# Patient Record
Sex: FEMALE | Race: WHITE | HISPANIC OR LATINO | Employment: OTHER | URBAN - METROPOLITAN AREA
[De-identification: names, ages, dates, MRNs, and addresses within clinical notes are randomized per-mention and may not be internally consistent; named-entity substitution may affect disease eponyms.]

---

## 2017-08-07 ENCOUNTER — HOSPITAL ENCOUNTER (OUTPATIENT)
Dept: MAMMOGRAPHY | Facility: CLINIC | Age: 62
Discharge: HOME | End: 2017-08-07
Attending: FAMILY MEDICINE | Admitting: FAMILY MEDICINE
Payer: COMMERCIAL

## 2017-08-07 DIAGNOSIS — Z12.31 VISIT FOR SCREENING MAMMOGRAM: ICD-10-CM

## 2017-08-07 PROCEDURE — 77063 BREAST TOMOSYNTHESIS BI: CPT

## 2017-08-07 PROCEDURE — G0202 SCR MAMMO BI INCL CAD: HCPCS

## 2017-09-03 ENCOUNTER — HOSPITAL ENCOUNTER (EMERGENCY)
Facility: CLINIC | Age: 62
Discharge: HOME | End: 2017-09-03
Attending: EMERGENCY MEDICINE | Admitting: EMERGENCY MEDICINE
Payer: COMMERCIAL

## 2017-09-03 ENCOUNTER — APPOINTMENT (OUTPATIENT)
Dept: CT IMAGING | Facility: CLINIC | Age: 62
End: 2017-09-03
Attending: EMERGENCY MEDICINE
Payer: COMMERCIAL

## 2017-09-03 VITALS
HEART RATE: 111 BPM | WEIGHT: 149 LBS | TEMPERATURE: 98.2 F | HEIGHT: 60 IN | RESPIRATION RATE: 18 BRPM | OXYGEN SATURATION: 96 % | SYSTOLIC BLOOD PRESSURE: 115 MMHG | DIASTOLIC BLOOD PRESSURE: 78 MMHG | BODY MASS INDEX: 29.25 KG/M2

## 2017-09-03 DIAGNOSIS — R31.9 URINARY TRACT INFECTION WITH HEMATURIA, SITE UNSPECIFIED: ICD-10-CM

## 2017-09-03 DIAGNOSIS — N39.0 URINARY TRACT INFECTION WITH HEMATURIA, SITE UNSPECIFIED: ICD-10-CM

## 2017-09-03 LAB
ALBUMIN UR-MCNC: 10 MG/DL
ANION GAP SERPL CALCULATED.3IONS-SCNC: 7 MMOL/L (ref 3–14)
APPEARANCE UR: ABNORMAL
BILIRUB UR QL STRIP: NEGATIVE
BUN SERPL-MCNC: 13 MG/DL (ref 7–30)
CALCIUM SERPL-MCNC: 9.1 MG/DL (ref 8.5–10.1)
CHLORIDE SERPL-SCNC: 107 MMOL/L (ref 94–109)
CO2 SERPL-SCNC: 28 MMOL/L (ref 20–32)
COLOR UR AUTO: YELLOW
CREAT SERPL-MCNC: 0.75 MG/DL (ref 0.52–1.04)
ERYTHROCYTE [DISTWIDTH] IN BLOOD BY AUTOMATED COUNT: 12.7 % (ref 10–15)
GFR SERPL CREATININE-BSD FRML MDRD: 78 ML/MIN/1.7M2
GLUCOSE SERPL-MCNC: 114 MG/DL (ref 70–99)
GLUCOSE UR STRIP-MCNC: NEGATIVE MG/DL
HCT VFR BLD AUTO: 37.7 % (ref 35–47)
HGB BLD-MCNC: 12.8 G/DL (ref 11.7–15.7)
HGB UR QL STRIP: ABNORMAL
KETONES UR STRIP-MCNC: NEGATIVE MG/DL
LEUKOCYTE ESTERASE UR QL STRIP: ABNORMAL
MCH RBC QN AUTO: 32.6 PG (ref 26.5–33)
MCHC RBC AUTO-ENTMCNC: 34 G/DL (ref 31.5–36.5)
MCV RBC AUTO: 96 FL (ref 78–100)
MUCOUS THREADS #/AREA URNS LPF: PRESENT /LPF
NITRATE UR QL: NEGATIVE
PH UR STRIP: 6.5 PH (ref 5–7)
PLATELET # BLD AUTO: 289 10E9/L (ref 150–450)
POTASSIUM SERPL-SCNC: 3.6 MMOL/L (ref 3.4–5.3)
RBC # BLD AUTO: 3.93 10E12/L (ref 3.8–5.2)
RBC #/AREA URNS AUTO: >182 /HPF (ref 0–2)
SODIUM SERPL-SCNC: 142 MMOL/L (ref 133–144)
SOURCE: ABNORMAL
SP GR UR STRIP: 1.01 (ref 1–1.03)
UROBILINOGEN UR STRIP-MCNC: NORMAL MG/DL (ref 0–2)
WBC # BLD AUTO: 7.2 10E9/L (ref 4–11)
WBC #/AREA URNS AUTO: 5 /HPF (ref 0–2)

## 2017-09-03 PROCEDURE — 25000128 H RX IP 250 OP 636: Performed by: EMERGENCY MEDICINE

## 2017-09-03 PROCEDURE — 87088 URINE BACTERIA CULTURE: CPT | Performed by: EMERGENCY MEDICINE

## 2017-09-03 PROCEDURE — 81001 URINALYSIS AUTO W/SCOPE: CPT | Performed by: EMERGENCY MEDICINE

## 2017-09-03 PROCEDURE — 74176 CT ABD & PELVIS W/O CONTRAST: CPT

## 2017-09-03 PROCEDURE — 96374 THER/PROPH/DIAG INJ IV PUSH: CPT

## 2017-09-03 PROCEDURE — 99285 EMERGENCY DEPT VISIT HI MDM: CPT | Mod: 25

## 2017-09-03 PROCEDURE — 96375 TX/PRO/DX INJ NEW DRUG ADDON: CPT

## 2017-09-03 PROCEDURE — 85027 COMPLETE CBC AUTOMATED: CPT | Performed by: EMERGENCY MEDICINE

## 2017-09-03 PROCEDURE — 87086 URINE CULTURE/COLONY COUNT: CPT | Performed by: EMERGENCY MEDICINE

## 2017-09-03 PROCEDURE — 80048 BASIC METABOLIC PNL TOTAL CA: CPT | Performed by: EMERGENCY MEDICINE

## 2017-09-03 PROCEDURE — 87186 SC STD MICRODIL/AGAR DIL: CPT | Performed by: EMERGENCY MEDICINE

## 2017-09-03 RX ORDER — MORPHINE SULFATE 4 MG/ML
4 INJECTION, SOLUTION INTRAMUSCULAR; INTRAVENOUS ONCE
Status: COMPLETED | OUTPATIENT
Start: 2017-09-03 | End: 2017-09-03

## 2017-09-03 RX ORDER — CEPHALEXIN 500 MG/1
500 CAPSULE ORAL 2 TIMES DAILY
Qty: 14 CAPSULE | Refills: 0 | Status: SHIPPED | OUTPATIENT
Start: 2017-09-03 | End: 2017-09-10

## 2017-09-03 RX ORDER — ONDANSETRON 2 MG/ML
4 INJECTION INTRAMUSCULAR; INTRAVENOUS
Status: COMPLETED | OUTPATIENT
Start: 2017-09-03 | End: 2017-09-03

## 2017-09-03 RX ORDER — HYDROCODONE BITARTRATE AND ACETAMINOPHEN 5; 325 MG/1; MG/1
1 TABLET ORAL EVERY 4 HOURS PRN
Qty: 10 TABLET | Refills: 0 | Status: ON HOLD | OUTPATIENT
Start: 2017-09-03 | End: 2017-11-03

## 2017-09-03 RX ORDER — PHENAZOPYRIDINE HYDROCHLORIDE 200 MG/1
200 TABLET, FILM COATED ORAL 3 TIMES DAILY PRN
Qty: 6 TABLET | Refills: 0 | Status: SHIPPED | OUTPATIENT
Start: 2017-09-03 | End: 2017-09-05

## 2017-09-03 RX ADMIN — ONDANSETRON 4 MG: 2 SOLUTION INTRAMUSCULAR; INTRAVENOUS at 21:35

## 2017-09-03 RX ADMIN — MORPHINE SULFATE 4 MG: 4 INJECTION, SOLUTION INTRAMUSCULAR; INTRAVENOUS at 21:35

## 2017-09-03 ASSESSMENT — ENCOUNTER SYMPTOMS
DYSURIA: 1
FLANK PAIN: 1
HEMATURIA: 1
FEVER: 0

## 2017-09-03 NOTE — ED AVS SNAPSHOT
Emergency Department    [address] [facility] [address]    Phone:  [phone]    Fax:  [phone]                                       Kelsie Liang   MRN: [id number]    Department:   Emergency Department   Date of Visit:  9/3/2017           After Visit Summary Signature Page     I have received my discharge instructions, and my questions have been answered. I have discussed any challenges I see with this plan with the nurse or doctor.    ..........................................................................................................................................  Patient/Patient Representative Signature      ..........................................................................................................................................  Patient Representative Print Name and Relationship to Patient    ..................................................               ................................................  Date                                            Time    ..........................................................................................................................................  Reviewed by Signature/Title    ...................................................              ..............................................  Date                                                            Time

## 2017-09-03 NOTE — ED AVS SNAPSHOT
Emergency Department    [address] [facility] [address]    Phone:  [phone]    Fax:  [phone]                                       Kelsie Liang   MRN: [id number]    Department:   Emergency Department   Date of Visit:  9/3/2017           Patient Information     Date Of Birth          1955        Your diagnoses for this visit were:     Urinary tract infection with hematuria, site unspecified        You were seen by Iris Mars MD.      Follow-up Information     Schedule an appointment as soon as possible for a visit with Iona Gabriel MD.    Specialty:  Family Practice    Contact information:    [facility]  [address] [facility] [address]  [facility] [id number] [phone]          Follow up with  Emergency Department.    Specialty:  EMERGENCY MEDICINE    Why:  If symptoms worsen    Contact information:    [address] [facility] [id number] [phone]        Discharge Instructions       Start the antibiotics   Take the norco for pain as needed  If the blood does not resolve with treatment, then would recommend following up with urology or PCP    Discharge Instructions  Urinary Tract Infection  You or your child have been diagnosed with a urinary tract infection, or UTI. The urinary tract includes the kidneys (which make urine/pee), ureters (the tubes that carry urine/pee from the kidneys to the bladder), the bladder (which stores urine/pee), and urethra (the tube that carries urine/pee out of the bladder). Urinary tract infections occur when bacteria travel up the urethra into the bladder (bladder infection) and, in some cases, from there into the kidneys (kidney infection).  Generally, every Emergency Department visit should have a follow-up clinic visit with either a primary or a specialty clinic/provider. Please follow-up as instructed by your emergency provider today.  Return to the Emergency Department if:    You or your  child have severe back pain.    You or your child are vomiting (throwing up) so that you cannot take your medicine.    You or your child have a new fever (had not previously had a fever) over 101 F.    You or your child have confusion or are very weak, or feel very ill.    Your child seems much more ill, will not wake up, will not respond right, or is crying for a long time and will not calm down.    You or your child are showing signs of dehydration. These signs may include decreased urination (pee), dry mouth/gums/tongue, or decreased activity.    Follow-up with your provider:     Children under 24 months need to be seen by their regular provider within one week after a diagnosis of a UTI. It may be necessary to do some more tests to look at the child s kidney or bladder.    You should begin to feel better within 24 - 48 hours of starting your antibiotic; follow-up with your regular clinic/doctor/provider if this is not the case.    Treatment:     You will be treated with an antibiotic to kill the bacteria. We have to make an educated guess, based on what we know about common bacteria and antibiotics, as to which antibiotic will work for your infection. We will be correct most times but there will be some cases where the antibiotic chosen is not correct (see urine cultures below).    Take a pain medication such as acetaminophen (Tylenol ) or ibuprofen (Advil , Motrin , Nuprin ).    Phenazopyridine (Pyridium , Uristat ) is a prescription medication that numbs the bladder to reduce the burning pain of some UTIs.  The same medication is available in a non-prescription version (Azo-Standard , Urodol ). This medication will change the color of the urine and tears (usually blue or orange). If you wear contacts, do not wear them while taking this medication as they may be stained by the medication.    Urine Cultures:    If indicated, a urine culture may have been performed today. This test generally takes 24-48 hours to  "complete so the results are not known at this time. The results can confirm that an infection is present but also determine which antibiotic is effective for the specific bacteria that is causing the infection. If your urine culture shows that the antibiotic you were given today will not work to treat your infection, we will attempt to contact you to make arrangements to change the antibiotic. If the culture confirms that the antibiotic is effective for your infection, you will not be contacted. We often recommend follow-up with your regular physician/provider on the culture results regardless of this process.    Antibiotic Warning:     If you have been placed on antibiotics - watch for signs of allergic reaction.  These include rash, lip swelling, difficulty breathing, wheezing, and dizziness.  If you develop any of these symptoms, stop the antibiotic immediately and go to an emergency room or urgent care for evaluation.    Probiotics: If you have been given an antibiotic, you may want to also take a probiotic pill or eat yogurt with live cultures. Probiotics have \"good bacteria\" to help your intestines stay healthy. Studies have shown that probiotics help prevent diarrhea and other intestine problems (including C. diff infection) when you take antibiotics. You can buy these without a prescription in the pharmacy section of the store.   If you were given a prescription for medicine here today, be sure to read all of the information (including the package insert) that comes with your prescription.  This will include important information about the medicine, its side effects, and any warnings that you need to know about.  The pharmacist who fills the prescription can provide more information and answer questions you may have about the medicine.  If you have questions or concerns that the pharmacist cannot address, please call or return to the Emergency Department.   Remember that you can always come back to the " Emergency Department if you are not able to see your regular provider in the amount of time listed above, if you get any new symptoms, or if there is anything that worries you.    Opioid Medication Information    You have been given a prescription for an opioid (narcotic) pain medicine and/or have received a pain medicine while here in the Emergency Department. These medicines can make you drowsy or impaired. You must not drive, operate dangerous equipment, or engage in any other dangerous activities while taking these medications. If you drive while taking these medications, you could be arrested for driving under the influence (DUI). Do not drink any alcohol while you are taking these medications.     Opioid pain medications can cause addiction. If you have a history of chemical dependency of any type, you are at a higher risk of becoming addicted to pain medications.  Only take these prescribed medications to treat your pain when all other options have been tried. Take it for as short a time and as few doses as possible. Store your pain pills in a secure place, as they are frequently stolen and provide a dangerous opportunity for children or visitors in your house to start abusing these powerful medications. We will not replace any lost or stolen medicine. If you do not finish your medication, please dispose of the remaining medication as recommended by your pharmacist.     The [place] [employer] has additional information on medication disposal: https://www.pca.[facility].[place].us/living-green/managing-unwanted-medications.      Many prescription pain medications contain Tylenol  (acetaminophen), including Vicodin , Tylenol #3 , Norco , Lortab , and Percocet .  You should not take any extra pills of Tylenol  if you are using these prescription medications or you can get very sick.  Do not ever take more than 3000 mg of acetaminophen in any 24 hour period.    All opioids tend to cause constipation. Drink  plenty of water and eat foods that have a lot of fiber, such as fruits, vegetables, prune juice, apple juice and high fiber cereal.  Take a laxative if you don t move your bowels at least every other day. Miralax , Milk of Magnesia, Colace , or Senna  can be used to keep you regular.          24 Hour Appointment Hotline       To make an appointment at any [facility], call [phone] ([phone]). If you don't have a family doctor or clinic, we will help you find one. [facility] clinics are conveniently located to serve the needs of you and your family.             Review of your medicines      START taking        Dose / Directions Last dose taken    cephALEXin 500 MG capsule   Commonly known as:  KEFLEX   Dose:  500 mg   Quantity:  14 capsule        Take 1 capsule (500 mg) by mouth 2 times daily for 7 days   Refills:  0        HYDROcodone-acetaminophen 5-325 MG per tablet   Commonly known as:  NORCO   Dose:  1 tablet   Quantity:  10 tablet        Take 1 tablet by mouth every 4 hours as needed for moderate to severe pain   Refills:  0        phenazopyridine 200 MG tablet   Commonly known as:  PYRIDIUM   Dose:  200 mg   Quantity:  6 tablet        Take 1 tablet (200 mg) by mouth 3 times daily as needed for irritation   Refills:  0          Our records show that you are taking the medicines listed below. If these are incorrect, please call your family doctor or clinic.        Dose / Directions Last dose taken    acetaminophen 325 MG tablet   Commonly known as:  TYLENOL   Dose:  650 mg   Quantity:  100 tablet        Take 2 tablets (650 mg) by mouth every 4 hours as needed for mild pain or fever   Refills:  0        AMBIEN PO   Dose:  5 mg        Take 5 mg by mouth At Bedtime 2 tabs   Refills:  0        CYMBALTA PO   Dose:  120 mg   Indication:  Major Depressive Disorder, Musculoskeletal Pain        Take 120 mg by mouth daily   Refills:  0        hydrOXYzine 25 MG tablet   Commonly known as:  ATARAX   Dose:  25  mg   Quantity:  40 tablet        Take 1 tablet (25 mg) by mouth every 6 hours as needed for itching (and nausea, spasms, adjuvant pain)   Refills:  0        ondansetron 4 MG ODT tab   Commonly known as:  ZOFRAN-ODT   Dose:  4 mg   Quantity:  15 tablet        Take 1 tablet (4 mg) by mouth every 6 hours as needed for nausea   Refills:  0        oxyCODONE 5 MG IR tablet   Commonly known as:  ROXICODONE   Dose:  5-15 mg   Quantity:  75 tablet        Take 1-3 tablets (5-15 mg) by mouth every 3 hours as needed for pain or other (Moderate to Severe)   Refills:  0        PROVIGIL PO   Dose:  200 mg        Take 200 mg by mouth daily Pt taking 2 tabs daily, unsure of dose   Refills:  0        senna-docusate 8.6-50 MG per tablet   Commonly known as:  SENOKOT-S;PERICOLACE   Dose:  2 tablet   Quantity:  30 tablet        Take 2 tablets by mouth daily as needed for constipation Take while on oral narcotics to prevent or treat constipation.   Refills:  0        XANAX PO   Dose:  0.25 mg        Take 0.25 mg by mouth 4 times daily as needed for anxiety   Refills:  0                Prescriptions were sent or printed at these locations (3 Prescriptions)                   Other Prescriptions                Printed at Department/Unit printer (3 of 3)         cephALEXin (KEFLEX) 500 MG capsule               phenazopyridine (PYRIDIUM) 200 MG tablet               HYDROcodone-acetaminophen (NORCO) 5-325 MG per tablet                Procedures and tests performed during your visit     Abd/pelvis CT no contrast - Stone Protocol    Basic metabolic panel    CBC (+ platelets, no diff)    UA with Microscopic reflex to Culture    Urine Culture Aerobic Bacterial      Orders Needing Specimen Collection     None      Pending Results     Date and Time Order Name Status Description    9/3/2017 2028 Urine Culture Aerobic Bacterial Preliminary             Pending Culture Results     Date and Time Order Name Status Description    9/3/2017 2028 Urine  Culture Aerobic Bacterial Preliminary             Pending Results Instructions     If you had any lab results that were not finalized at the time of your Discharge, you can call the ED Lab Result RN at [phone]. You will be contacted by this team for any positive Lab results or changes in treatment. The nurses are available 7 days a week from 10A to 6:30P.  You can leave a message 24 hours per day and they will return your call.        Test Results From Your Hospital Stay        9/3/2017  8:54 PM      Component Results     Component Value Ref Range & Units Status    Color Urine Yellow  Final    Appearance Urine Slightly Cloudy  Final    Glucose Urine Negative NEG^Negative mg/dL Final    Bilirubin Urine Negative NEG^Negative Final    Ketones Urine Negative NEG^Negative mg/dL Final    Specific Gravity Urine 1.014 1.003 - 1.035 Final    Blood Urine Large (A) NEG^Negative Final    pH Urine 6.5 5.0 - 7.0 pH Final    Protein Albumin Urine 10 (A) NEG^Negative mg/dL Final    Urobilinogen mg/dL Normal 0.0 - 2.0 mg/dL Final    Nitrite Urine Negative NEG^Negative Final    Leukocyte Esterase Urine Large (A) NEG^Negative Final    Source Midstream Urine  Final    WBC Urine 5 (H) 0 - 2 /HPF Final    RBC Urine >182 (H) 0 - 2 /HPF Final    Mucous Urine Present (A) NEG^Negative /LPF Final         9/3/2017 10:51 PM      Component Results     Component    Specimen Description    Midstream Urine    Special Requests    Specimen received in preservative    Culture Micro    PENDING         9/3/2017  9:53 PM      Component Results     Component Value Ref Range & Units Status    Sodium 142 133 - 144 mmol/L Final    Potassium 3.6 3.4 - 5.3 mmol/L Final    Chloride 107 94 - 109 mmol/L Final    Carbon Dioxide 28 20 - 32 mmol/L Final    Anion Gap 7 3 - 14 mmol/L Final    Glucose 114 (H) 70 - 99 mg/dL Final    Urea Nitrogen 13 7 - 30 mg/dL Final    Creatinine 0.75 0.52 - 1.04 mg/dL Final    GFR Estimate 78 >60 mL/min/1.7m2 Final    Non   GFR Calc    GFR Estimate If Black >90 >60 mL/min/1.7m2 Final    African American GFR Calc    Calcium 9.1 8.5 - 10.1 mg/dL Final         9/3/2017  9:38 PM      Component Results     Component Value Ref Range & Units Status    WBC 7.2 4.0 - 11.0 10e9/L Final    RBC Count 3.93 3.8 - 5.2 10e12/L Final    Hemoglobin 12.8 11.7 - 15.7 g/dL Final    Hematocrit 37.7 35.0 - 47.0 % Final    MCV 96 78 - 100 fl Final    MCH 32.6 26.5 - 33.0 pg Final    MCHC 34.0 31.5 - 36.5 g/dL Final    RDW 12.7 10.0 - 15.0 % Final    Platelet Count 289 150 - 450 10e9/L Final         9/3/2017 10:51 PM      Narrative     CT ABDOMEN AND PELVIS WITHOUT CONTRAST 9/3/2017 10:24 PM     HISTORY: Flank pain, hematuria. Rule out stone.    COMPARISON: 12/7/2015.    TECHNIQUE: Axial CT images of the abdomen and pelvis from the  diaphragm to the symphysis pubis were acquired without IV contrast.  Radiation dose for this scan was reduced using automated exposure  control, adjustment of the mA and/or kV according to patient size, or  iterative reconstruction technique.    FINDINGS: There are no stones seen within either kidney, either  ureter, or the bladder. There is no hydroureter or hydronephrosis.  There is no perinephric fat stranding. Kidneys are normal in size and  configuration.  Moderate stool. There are no dilated loops of small  intestine or large bowel to suggest ileus or obstruction. No  diverticulitis. Appendix not confidently identified. Cholecystectomy  changes. Liver unremarkable. No splenomegaly.  No definite adrenal  nodules.  Pancreas grossly unremarkable. The remainder of the  visualized abdomen is unremarkable on this noncontrast scan. Survey of  the visualized bony structures demonstrates no destructive bony  lesions.   The visualized lung bases are unremarkable.         Impression     IMPRESSION: No acute process demonstrated in the abdomen and pelvis.     MANDO CALLAWAY MD                Clinical Quality Measure:  Blood Pressure Screening     Your blood pressure was checked while you were in the emergency department today. The last reading we obtained was  BP: 130/84 . Please read the guidelines below about what these numbers mean and what you should do about them.  If your systolic blood pressure (the top number) is less than 120 and your diastolic blood pressure (the bottom number) is less than 80, then your blood pressure is normal. There is nothing more that you need to do about it.  If your systolic blood pressure (the top number) is 120-139 or your diastolic blood pressure (the bottom number) is 80-89, your blood pressure may be higher than it should be. You should have your blood pressure rechecked within a year by a primary care provider.  If your systolic blood pressure (the top number) is 140 or greater or your diastolic blood pressure (the bottom number) is 90 or greater, you may have high blood pressure. High blood pressure is treatable, but if left untreated over time it can put you at risk for heart attack, stroke, or kidney failure. You should have your blood pressure rechecked by a primary care provider within the next 4 weeks.  If your provider in the emergency department today gave you specific instructions to follow-up with your doctor or provider even sooner than that, you should follow that instruction and not wait for up to 4 weeks for your follow-up visit.        Thank you for choosing [facility]       Thank you for choosing [facility] for your care. Our goal is always to provide you with excellent care. Hearing back from our patients is one way we can continue to improve our services. Please take a few minutes to complete the written survey that you may receive in the mail after you visit with us. Thank you!        [employer] Information     [employer] lets you send messages to your doctor, view your test results, renew your prescriptions, schedule appointments and more. To sign up, go to  [email or web address] . Click on \"Log in\" on the left side of the screen, which will take you to the Welcome page. Then click on \"Sign up Now\" on the right side of the page.     You will be asked to enter the access code listed below, as well as some personal information. Please follow the directions to create your username and password.     Your access code is: [email or web address]  Expires: 2017 11:19 PM     Your access code will  in 90 days. If you need help or a new code, please call your [facility] clinic or [phone].        Care EveryWhere ID     This is your Care EveryWhere ID. This could be used by other organizations to access your [facility] medical records  [id number]        Equal Access to Services     NATALEE HOWELL : Bowen Mendez, nica perales, shanna arteaga, jeremy todd . So [facility] [phone].    ATENCIÓN: Si habla español, tiene a méndez disposición servicios gratuitos de asistencia lingüística. Llame al [phone].    We comply with applicable federal civil rights laws and [place] laws. We do not discriminate on the basis of race, color, national origin, age, disability sex, sexual orientation or gender identity.            After Visit Summary       This is your record. Keep this with you and show to your community pharmacist(s) and doctor(s) at your next visit.                  "

## 2017-09-04 NOTE — ED PROVIDER NOTES
History     Chief Complaint:  Flank Pain    HPI   Kelsie Liang is a 62 year old female, with a history of kidney stones and bladder infections, who presents with her  to the emergency department for evaluation of flank pain that began today. The patient reports that she began to experience dysuria yesterday and noted some hematuria this evening. The patient noted her flank pain was bilateral, but more prominent on her right side. The patient denies experiencing any fever. To note, the patient reports that this does not feel like her previous kidney stones. Feels like prior UTI. No vomiting.    Allergies:  Mobic     Medications:    Tylenol  Roxicodone  Senna-docusate  Hydroxyzine  Ambien  Xanax  Zofran  Cymbalta  Provigil     Past Medical History:    Anxiety  Arthritis  Bursitis  Concussion  CPAP  Depressive disorder  GERD  Heart Murmer  Hematuria   Kidney Stone  Sleep apnea    Past Surgical History:    Bunionectomy francesco, repair hammer toe(s) combined  Knee surgery  Laparoscopic cholecystectomy  Orthopedic surgery  Osteotomy foot  Shoulder surgery  Tonsillectomy    Family History:    The patient denies any relevant family medical history.     Social History:  The patient was accompanied to the ED by .  Smoking Status: No  Smokeless Tobacco: N/A  Alcohol Use: Yes   Marital Status:   [2]     Review of Systems   Constitutional: Negative for fever.   Genitourinary: Positive for dysuria, flank pain and hematuria.   All other systems reviewed and are negative.    Physical Exam   Vitals:  Patient Vitals for the past 24 hrs:   BP Temp Temp src Pulse Resp SpO2 Height Weight   09/03/17 2200 - - - - - 94 % - -   09/03/17 2138 130/84 - - - - - - -   09/03/17 1958 129/80 98.2  F (36.8  C) Oral 111 18 97 % 1.524 m (5') 67.6 kg (149 lb)      Physical Exam  General: Appears mildly uncomfortable  Eyes:  The pupils are equal and round  ENT:    Moist mucous membranes  Neck:  Normal range of  motion  CV:  Tachycardic rate and rhythm    Skin warm and well perfused   Resp:  Lungs are clear    Non-labored    No rales    No wheezing   GI:  Mild lower abdominal tenderness    No guarding, rebound    No peritoneal signs    Mild lower back tenderness    No CVA tenderness  MS:  Normal muscular tone  Skin:  No rash or acute skin lesions noted  Neuro:   Awake, alert.      Speech is normal and fluent.    Face is symmetric.     Moves all extremities  Psych:  Normal affect.  Appropriate interactions.     Emergency Department Course     Imaging:  Radiology findings were communicated with the patient and family who voiced understanding of the findings.  CT Abd/Pelvis:  IMPRESSION: No acute process demonstrated in the abdomen and pelvis.   Reading per radiology.     Laboratory:  Laboratory findings were communicated with the patient and family who voiced understanding of the findings.  UA with Microscopic reflex to Culture: Urine Blood Large (A), Leukocyte Esterase Urine Large (A), Protein Albumin Urine 10 (A), Mucous Urine Present (A), WBC/HPF 5 (H), RBC/HPF >182 (H) o/w WNL  Urine Culture Aerobic Bacterial: In process    CBC: AWNL (WBC 7.2, HGB 12.8, )  BMP: Glucose 114 (H) o/w WNL (Creatinine 0.75)     Interventions:  2135 Morphine 4 mg IV  2135 Zofran 4 mg IV     Emergency Department Course:  Nursing notes and vitals reviewed.  I performed an exam of the patient as documented above.   The patient provided a urine sample here in the emergency department. This was sent for laboratory testing, findings above.   IV was inserted and blood was drawn for laboratory testing, results above.  The patient was sent for a CT Abd/Pelvis while in the emergency department, results above.      9:00 PM: I spoke with the patient and discuss her urinalysis.     I discussed the treatment plan with the patient. They expressed understanding of this plan and consented to discharge. They will be discharged home with instructions for  care and follow up. In addition, the patient will return to the emergency department if their symptoms persist, worsen, if new symptoms arise or if there is any concern.  All questions were answered.     I personally reviewed the laboratory results with the Patient and spouse and answered all related questions prior to discharge.    Impression & Plan      Medical Decision Making:  Kelsie Liang is a 62 year old female who presented to the emergency department with flank pain and hematuria. Urinalysis shows significant hematuria with only 5 white blood cells. She reports that these symptoms are consistent with prior urinary tract infections. Given how much blood is in the urine, I did obtain CT to look for stone, which there was none. Creatinine and hemoglobin are normal. Will treat her as urinary tract infection given no stone was found on the CT and symptoms consistent with UTI. Recommend follow up with primary care provider. Discussed that if hematuria does not resolve with treatment would recommend evaluation with urology. Reasons to return to the emergency department were discussed.     Diagnosis:    ICD-10-CM    1. Urinary tract infection with hematuria, site unspecified N39.0     R31.9         Disposition:   Discharged.    Discharge Medications:  Discharge Medication List as of 9/3/2017 11:20 PM      START taking these medications    Details   cephALEXin (KEFLEX) 500 MG capsule Take 1 capsule (500 mg) by mouth 2 times daily for 7 days, Disp-14 capsule, R-0, Local Print      phenazopyridine (PYRIDIUM) 200 MG tablet Take 1 tablet (200 mg) by mouth 3 times daily as needed for irritation, Disp-6 tablet, R-0, Local Print      HYDROcodone-acetaminophen (NORCO) 5-325 MG per tablet Take 1 tablet by mouth every 4 hours as needed for moderate to severe pain, Disp-10 tablet, R-0, Local Print             Scribe Disclosure:  Betty MCCABE, am serving as a scribe at 8:18 PM on 9/3/2017 to document services  personally performed by Iris Mars MD, based on my observations and the provider's statements to me.  9/3/2017    EMERGENCY DEPARTMENT       Iris Mars MD  09/04/17 0201

## 2017-09-04 NOTE — DISCHARGE INSTRUCTIONS
Start the antibiotics   Take the norco for pain as needed  If the blood does not resolve with treatment, then would recommend following up with urology or PCP    Discharge Instructions  Urinary Tract Infection  You or your child have been diagnosed with a urinary tract infection, or UTI. The urinary tract includes the kidneys (which make urine/pee), ureters (the tubes that carry urine/pee from the kidneys to the bladder), the bladder (which stores urine/pee), and urethra (the tube that carries urine/pee out of the bladder). Urinary tract infections occur when bacteria travel up the urethra into the bladder (bladder infection) and, in some cases, from there into the kidneys (kidney infection).  Generally, every Emergency Department visit should have a follow-up clinic visit with either a primary or a specialty clinic/provider. Please follow-up as instructed by your emergency provider today.  Return to the Emergency Department if:    You or your child have severe back pain.    You or your child are vomiting (throwing up) so that you cannot take your medicine.    You or your child have a new fever (had not previously had a fever) over 101 F.    You or your child have confusion or are very weak, or feel very ill.    Your child seems much more ill, will not wake up, will not respond right, or is crying for a long time and will not calm down.    You or your child are showing signs of dehydration. These signs may include decreased urination (pee), dry mouth/gums/tongue, or decreased activity.    Follow-up with your provider:     Children under 24 months need to be seen by their regular provider within one week after a diagnosis of a UTI. It may be necessary to do some more tests to look at the child s kidney or bladder.    You should begin to feel better within 24 - 48 hours of starting your antibiotic; follow-up with your regular clinic/doctor/provider if this is not the case.    Treatment:     You will be treated with an  antibiotic to kill the bacteria. We have to make an educated guess, based on what we know about common bacteria and antibiotics, as to which antibiotic will work for your infection. We will be correct most times but there will be some cases where the antibiotic chosen is not correct (see urine cultures below).    Take a pain medication such as acetaminophen (Tylenol ) or ibuprofen (Advil , Motrin , Nuprin ).    Phenazopyridine (Pyridium , Uristat ) is a prescription medication that numbs the bladder to reduce the burning pain of some UTIs.  The same medication is available in a non-prescription version (Azo-Standard , Urodol ). This medication will change the color of the urine and tears (usually blue or orange). If you wear contacts, do not wear them while taking this medication as they may be stained by the medication.    Urine Cultures:    If indicated, a urine culture may have been performed today. This test generally takes 24-48 hours to complete so the results are not known at this time. The results can confirm that an infection is present but also determine which antibiotic is effective for the specific bacteria that is causing the infection. If your urine culture shows that the antibiotic you were given today will not work to treat your infection, we will attempt to contact you to make arrangements to change the antibiotic. If the culture confirms that the antibiotic is effective for your infection, you will not be contacted. We often recommend follow-up with your regular physician/provider on the culture results regardless of this process.    Antibiotic Warning:     If you have been placed on antibiotics - watch for signs of allergic reaction.  These include rash, lip swelling, difficulty breathing, wheezing, and dizziness.  If you develop any of these symptoms, stop the antibiotic immediately and go to an emergency room or urgent care for evaluation.    Probiotics: If you have been given an antibiotic, you  "may want to also take a probiotic pill or eat yogurt with live cultures. Probiotics have \"good bacteria\" to help your intestines stay healthy. Studies have shown that probiotics help prevent diarrhea and other intestine problems (including C. diff infection) when you take antibiotics. You can buy these without a prescription in the pharmacy section of the store.   If you were given a prescription for medicine here today, be sure to read all of the information (including the package insert) that comes with your prescription.  This will include important information about the medicine, its side effects, and any warnings that you need to know about.  The pharmacist who fills the prescription can provide more information and answer questions you may have about the medicine.  If you have questions or concerns that the pharmacist cannot address, please call or return to the Emergency Department.   Remember that you can always come back to the Emergency Department if you are not able to see your regular provider in the amount of time listed above, if you get any new symptoms, or if there is anything that worries you.    Opioid Medication Information    You have been given a prescription for an opioid (narcotic) pain medicine and/or have received a pain medicine while here in the Emergency Department. These medicines can make you drowsy or impaired. You must not drive, operate dangerous equipment, or engage in any other dangerous activities while taking these medications. If you drive while taking these medications, you could be arrested for driving under the influence (DUI). Do not drink any alcohol while you are taking these medications.     Opioid pain medications can cause addiction. If you have a history of chemical dependency of any type, you are at a higher risk of becoming addicted to pain medications.  Only take these prescribed medications to treat your pain when all other options have been tried. Take it for as " short a time and as few doses as possible. Store your pain pills in a secure place, as they are frequently stolen and provide a dangerous opportunity for children or visitors in your house to start abusing these powerful medications. We will not replace any lost or stolen medicine. If you do not finish your medication, please dispose of the remaining medication as recommended by your pharmacist.     The [place] Pollution Control Agency has additional information on medication disposal: https://www.pca.[facility].[place].us/living-green/managing-unwanted-medications.      Many prescription pain medications contain Tylenol  (acetaminophen), including Vicodin , Tylenol #3 , Norco , Lortab , and Percocet .  You should not take any extra pills of Tylenol  if you are using these prescription medications or you can get very sick.  Do not ever take more than 3000 mg of acetaminophen in any 24 hour period.    All opioids tend to cause constipation. Drink plenty of water and eat foods that have a lot of fiber, such as fruits, vegetables, prune juice, apple juice and high fiber cereal.  Take a laxative if you don t move your bowels at least every other day. Miralax , Milk of Magnesia, Colace , or Senna  can be used to keep you regular.

## 2017-09-04 NOTE — ED NOTES
"Patient denies fever/chills. Patient states he has a hx of bladder infections.  + Dysuria \"feeling Gravelly\".  Patient states that this is the earliest she caught it.  Patient given pitcher of water, patient denies nausea/vomiting.   "

## 2017-09-05 ENCOUNTER — CARE COORDINATION (OUTPATIENT)
Dept: CARE COORDINATION | Facility: CLINIC | Age: 62
End: 2017-09-05

## 2017-09-05 LAB
BACTERIA SPEC CULT: ABNORMAL
Lab: ABNORMAL
SPECIMEN SOURCE: ABNORMAL

## 2017-09-05 NOTE — PROGRESS NOTES
"Clinic Care Coordination Contact  OUTREACH    Referral Information:  Referral Source: ED Follow-Up  Reason for Contact: Initial: DX UTI  Care Conference: No     Universal Utilization:   ED Visits in last year: 1  Hospital visits in last year: 1  Last PCP appointment: 08/03/17  Missed Appointments: 0  Concerns: Good Utilization      Clinical Concerns:  Current Medical Concerns:   Patient Active Problem List   Diagnosis     Pain in shoulder     Post-proc states NEC     N&V (nausea and vomiting)     Pain in joint, pelvic region and thigh     Enthesopathy of hip region     Abdominal pain       Current Behavioral Concerns: None    Education Provided to patient: To call clinic if pain persists more than today and with any other concerns.  Pt states understanding.    Clinical Pathway: None    Medication Management:  Pt is independent with medication management.  Med rec completed today.  No side effects reported.  Needs refill on Pyridium.     Functional Status:  Mobility Status: Independent  Equipment Currently Used at Home: none  Transportation: Independant    Psychosocial:  Current living arrangement:: I live in a private home  Financial/Insurance: [email or web address].  Denies financial concerns.     Resources and Interventions:  Advanced Care Plans/Directives on file:: No  Referrals Placed:  None  Patient/Caregiver understanding: Pt states she is \"feeling better\" though continues to have some mild abdominal pain.  Pt rates today at 3/10.  She states the Pyridium is helping though is still experiencing some mild burning with urination and  frequency.  She has used the Norco that was prescribed in the ED \"last noc but has not needed it yet today\" She denies nausea or back pain.   She states she \"did not sleep too well last noc as she was up to urinate frequently\"     Frequency of Care Coordination:  Every 2-3 weeks  Upcoming appointment: To schedule f/u appointment 7-10 days after completion of abx for repeat UA.     Plan:  Will " request refill for Pyridium from PCP as requested from pt.   Will f/u with pt in 1-2 weeks.    Shivani Acosta RN  [facility]  Care Coordination  [phone]  oscar

## 2017-09-05 NOTE — LETTER
Health Care Home - Access Care Plan    About Me  Patient Name:  Kelsie Liang    YOB: 1955  Age:                            62 year old   Shruthi MRN:         [id number] Telephone Information:     Home Phone [phone]   Mobile Not on file.       Address:    [address] [place] [address] Email address:  [email or web address]      Emergency Contact(s)  Name Relationship Lgl Grd Work Phone Home Phone Mobile Phone   1. DREW LIANG Spouse   [phone]              Health Maintenance: Routine Health maintenance Reviewed: Up to date    My Access Plan  Medical Emergency 911   Questions or concerns during clinic hours Primary Clinic Line, I will call the clinic directly: Primary Clinic: [facility]- [phone]   24 Hour Appointment Line [phone] or  [phone])  (toll free)   24 Hour Nurse Line [phone] (toll free)   Questions or concerns outside clinic hours 24 Hour Appointment Line, I will call the after-hours on-call line:   [facility] [phone]   Preferred Urgent Care     Preferred Hospital Preferred Hospital: [facility]  [phone]   Preferred Pharmacy [facility] [address] [place] [address] [address] [facility] [place]     Behavioral Health Crisis Line The National Suicide Prevention Lifeline at 1-179.256.6447 or 911     My Care Team Members  Patient Care Team       Relationship Specialty Notifications Start End    Iona Gabriel MD PCP - General Family Practice  3/1/13     Phone: [phone] Fax: [phone]         [facility] [address] [place] [address]        My Medical and Care Information  Problem List   Patient Active Problem List   Diagnosis     Pain in shoulder     Post-proc states NEC     N&V (nausea and vomiting)     Pain in joint, pelvic region and thigh     Enthesopathy of hip region      Abdominal pain      Current Medications and Allergies:  See printed Medication Report

## 2017-09-05 NOTE — PROGRESS NOTES
Clinic Care Coordination Contact  [facility]/Voicemail    Referral Source: ED Follow-Up  Clinical Data: Care Coordinator Outreach  Outreach attempted x 1.  Left message on voicemail with call back information and requested return call.  Plan: Care Coordinator will try to reach patient again in 1-2 business days.    Shivani Acosta RN  [facility]  Care Coordination  [phone]

## 2017-10-11 ENCOUNTER — CARE COORDINATION (OUTPATIENT)
Dept: CARE COORDINATION | Facility: CLINIC | Age: 62
End: 2017-10-11

## 2017-10-11 NOTE — PROGRESS NOTES
Clinic Care Coordination Contact  [facility]/Voicemail    Referral Source: ED Follow-Up  Clinical Data: Care Coordinator Outreach  Outreach attempted x 1.  Left message on voicemail with call back information and requested return call.  Plan: Care Coordinator will try to reach patient again in 3-5 business days.    Shivani Acosta RN  [facility]  Care Coordination  [phone]

## 2017-10-31 ENCOUNTER — HOSPITAL ENCOUNTER (INPATIENT)
Facility: CLINIC | Age: 62
LOS: 2 days | Discharge: HOME | DRG: 863 | End: 2017-11-03
Attending: EMERGENCY MEDICINE | Admitting: INTERNAL MEDICINE
Payer: COMMERCIAL

## 2017-10-31 ENCOUNTER — APPOINTMENT (OUTPATIENT)
Dept: GENERAL RADIOLOGY | Facility: CLINIC | Age: 62
DRG: 863 | End: 2017-10-31
Attending: PHYSICIAN ASSISTANT
Payer: COMMERCIAL

## 2017-10-31 DIAGNOSIS — L03.114 CELLULITIS OF LEFT HAND: ICD-10-CM

## 2017-10-31 LAB
ANION GAP SERPL CALCULATED.3IONS-SCNC: 7 MMOL/L (ref 3–14)
BASOPHILS # BLD AUTO: 0 10E9/L (ref 0–0.2)
BASOPHILS NFR BLD AUTO: 0.1 %
BUN SERPL-MCNC: 16 MG/DL (ref 7–30)
CALCIUM SERPL-MCNC: 8.6 MG/DL (ref 8.5–10.1)
CHLORIDE SERPL-SCNC: 109 MMOL/L (ref 94–109)
CO2 SERPL-SCNC: 25 MMOL/L (ref 20–32)
CREAT SERPL-MCNC: 0.7 MG/DL (ref 0.52–1.04)
CRP SERPL-MCNC: 17.4 MG/L (ref 0–8)
DIFFERENTIAL METHOD BLD: ABNORMAL
DIFFERENTIAL METHOD BLD: NORMAL
EOSINOPHIL # BLD AUTO: 0.2 10E9/L (ref 0–0.7)
EOSINOPHIL NFR BLD AUTO: 2.3 %
ERYTHROCYTE [DISTWIDTH] IN BLOOD BY AUTOMATED COUNT: 12.4 % (ref 10–15)
ERYTHROCYTE [DISTWIDTH] IN BLOOD BY AUTOMATED COUNT: NORMAL % (ref 10–15)
ERYTHROCYTE [SEDIMENTATION RATE] IN BLOOD BY WESTERGREN METHOD: 15 MM/H (ref 0–30)
ERYTHROCYTE [SEDIMENTATION RATE] IN BLOOD BY WESTERGREN METHOD: NORMAL MM/H (ref 0–30)
GFR SERPL CREATININE-BSD FRML MDRD: 85 ML/MIN/1.7M2
GLUCOSE SERPL-MCNC: 132 MG/DL (ref 70–99)
HCT VFR BLD AUTO: 34.2 % (ref 35–47)
HCT VFR BLD AUTO: NORMAL % (ref 35–47)
HGB BLD-MCNC: 11.6 G/DL (ref 11.7–15.7)
HGB BLD-MCNC: NORMAL G/DL (ref 11.7–15.7)
IMM GRANULOCYTES # BLD: 0 10E9/L (ref 0–0.4)
IMM GRANULOCYTES NFR BLD: 0.1 %
LYMPHOCYTES # BLD AUTO: 1.9 10E9/L (ref 0.8–5.3)
LYMPHOCYTES NFR BLD AUTO: 21.5 %
MCH RBC QN AUTO: 32.1 PG (ref 26.5–33)
MCH RBC QN AUTO: NORMAL PG (ref 26.5–33)
MCHC RBC AUTO-ENTMCNC: 33.9 G/DL (ref 31.5–36.5)
MCHC RBC AUTO-ENTMCNC: NORMAL G/DL (ref 31.5–36.5)
MCV RBC AUTO: 95 FL (ref 78–100)
MCV RBC AUTO: NORMAL FL (ref 78–100)
MONOCYTES # BLD AUTO: 0.7 10E9/L (ref 0–1.3)
MONOCYTES NFR BLD AUTO: 7.4 %
NEUTROPHILS # BLD AUTO: 6.1 10E9/L (ref 1.6–8.3)
NEUTROPHILS NFR BLD AUTO: 68.6 %
NRBC # BLD AUTO: 0 10*3/UL
NRBC BLD AUTO-RTO: 0 /100
PLATELET # BLD AUTO: 278 10E9/L (ref 150–450)
PLATELET # BLD AUTO: NORMAL 10E9/L (ref 150–450)
POTASSIUM SERPL-SCNC: 3.9 MMOL/L (ref 3.4–5.3)
RBC # BLD AUTO: 3.61 10E12/L (ref 3.8–5.2)
RBC # BLD AUTO: NORMAL 10E12/L (ref 3.8–5.2)
SODIUM SERPL-SCNC: 141 MMOL/L (ref 133–144)
WBC # BLD AUTO: 8.9 10E9/L (ref 4–11)
WBC # BLD AUTO: NORMAL 10E9/L (ref 4–11)

## 2017-10-31 PROCEDURE — 86140 C-REACTIVE PROTEIN: CPT | Performed by: PHYSICIAN ASSISTANT

## 2017-10-31 PROCEDURE — 85652 RBC SED RATE AUTOMATED: CPT | Performed by: PHYSICIAN ASSISTANT

## 2017-10-31 PROCEDURE — 99220 ZZC INITIAL OBSERVATION CARE,LEVL III: CPT | Performed by: INTERNAL MEDICINE

## 2017-10-31 PROCEDURE — 85652 RBC SED RATE AUTOMATED: CPT | Performed by: EMERGENCY MEDICINE

## 2017-10-31 PROCEDURE — G0378 HOSPITAL OBSERVATION PER HR: HCPCS

## 2017-10-31 PROCEDURE — 99285 EMERGENCY DEPT VISIT HI MDM: CPT | Mod: 25

## 2017-10-31 PROCEDURE — 25000132 ZZH RX MED GY IP 250 OP 250 PS 637: Performed by: PHYSICIAN ASSISTANT

## 2017-10-31 PROCEDURE — 25000128 H RX IP 250 OP 636: Performed by: PHYSICIAN ASSISTANT

## 2017-10-31 PROCEDURE — 73130 X-RAY EXAM OF HAND: CPT | Mod: LT

## 2017-10-31 PROCEDURE — 80048 BASIC METABOLIC PNL TOTAL CA: CPT | Performed by: PHYSICIAN ASSISTANT

## 2017-10-31 PROCEDURE — 96365 THER/PROPH/DIAG IV INF INIT: CPT

## 2017-10-31 PROCEDURE — 85025 COMPLETE CBC W/AUTO DIFF WBC: CPT | Performed by: EMERGENCY MEDICINE

## 2017-10-31 RX ORDER — HYDROCODONE BITARTRATE AND ACETAMINOPHEN 5; 325 MG/1; MG/1
1-2 TABLET ORAL ONCE
Status: COMPLETED | OUTPATIENT
Start: 2017-10-31 | End: 2017-10-31

## 2017-10-31 RX ORDER — CEFTRIAXONE 1 G/1
1 INJECTION, POWDER, FOR SOLUTION INTRAMUSCULAR; INTRAVENOUS ONCE
Status: COMPLETED | OUTPATIENT
Start: 2017-10-31 | End: 2017-10-31

## 2017-10-31 RX ADMIN — CEFTRIAXONE 1 G: 1 INJECTION, POWDER, FOR SOLUTION INTRAMUSCULAR; INTRAVENOUS at 22:40

## 2017-10-31 RX ADMIN — HYDROCODONE BITARTRATE AND ACETAMINOPHEN 1 TABLET: 5; 325 TABLET ORAL at 22:05

## 2017-10-31 ASSESSMENT — ENCOUNTER SYMPTOMS
COLOR CHANGE: 1
FEVER: 0
VOMITING: 0
JOINT SWELLING: 1
NAUSEA: 0
ARTHRALGIAS: 1

## 2017-10-31 NOTE — IP AVS SNAPSHOT
MRN:[id number]                      After Visit Summary   10/31/2017    Kelsie Liang    MRN: [id number]           Thank you!     Thank you for choosing [facility] for your care. Our goal is always to provide you with excellent care. Hearing back from our patients is one way we can continue to improve our services. Please take a few minutes to complete the written survey that you may receive in the mail after you visit with us. Thank you!        Patient Information     Date Of Birth          1955        Designated Caregiver       Most Recent Value    Caregiver    Will someone help with your care after discharge? no      About your hospital stay     You were admitted on:  October 31, 2017 You last received care in the:  [facility] [address] [facility]    You were discharged on:  November 3, 2017        Reason for your hospital stay       You were admitted to the hospital due to hand infection . You are discharged on two antibiotics please continue to keep taking them and keep your follow up appointment with the PCP and surgery.                  Who to Call     For medical emergencies, please call 911.  For non-urgent questions about your medical care, please call your primary care provider or clinic, [phone]          Attending Provider     Provider Specialty    Sascha Collins MD Emergency Medicine    Karen Hodge MD Internal Medicine    Angy Terrell MD Internal Medicine       Primary Care Provider Office Phone # Fax #    Iona Gabriel [phone]      After Care Instructions     Activity       Your activity upon discharge: activity as tolerated            Diet       Follow this diet upon discharge: Orders Placed This Encounter      Regular Diet Adult                  Follow-up Appointments     Follow Up and recommended labs and tests       Follow up with primary care provider, Iona Gabriel, within 7 days to evaluate treatment change,  "to evaluate after surgery and for hospital follow- up.  No follow up labs or test are needed.    Follow up with your surgeon in 7 days.                  Further instructions from your care team       Please continue to take Keflex and clindamycin for next ten days .  Please take probiotics ( available over the counter ) script is also given for next couple of weeks two to three times a day to avoid antibiotic associated diarrhea.  Please try to consume yoghurt every day while you are on antibiotics.  Please follow up with your Primary care physician and surgery as an out patient.    Appointment at 1015 at [facility] office 11/6/17 with Irlanda/.    Pending Results     No orders found from 10/29/2017 to 11/1/2017.            Statement of Approval     Ordered          11/03/17 0820  I have reviewed and agree with all the recommendations and orders detailed in this document.  EFFECTIVE NOW     Approved and electronically signed by:  Angy Terrell MD             Admission Information     Date & Time Provider Department Dept. Phone    10/31/2017 Angy Terrell MD [facility] [address] [facility] [phone]      Your Vitals Were     Blood Pressure Pulse Temperature Respirations Height Weight    115/75 (BP Location: Right arm) 102 98.2  F (36.8  C) (Oral) 18 1.524 m (5') 70 kg (154 lb 5.2 oz)    Pulse Oximetry BMI (Body Mass Index)                96% 30.14 kg/m2          [employer] Information     [employer] lets you send messages to your doctor, view your test results, renew your prescriptions, schedule appointments and more. To sign up, go to [email or web address] . Click on \"Log in\" on the left side of the screen, which will take you to the Welcome page. Then click on \"Sign up Now\" on the right side of the page.     You will be asked to enter the access code listed below, as well as some personal information. Please follow the directions to create your username and password.     Your access code is: " [email or web address]  Expires: 2017 11:19 PM     Your access code will  in 90 days. If you need help or a new code, please call your [facility] clinic or [phone].        Care EveryWhere ID     This is your Care EveryWhere ID. This could be used by other organizations to access your [facility] medical records  [id number]        Equal Access to Services     [facility] : Hadii aad ku hadasho Soomaali, waaxda luqadaha, qaybta kaalmada adeegyada, waxay katiein hayaan adelisa tammyfrancisco lamercedes . So [facility] [phone].    ATENCIÓN: Si habla ema, tiene a méndez disposición servicios gratuitos de asistencia lingüística. Prisca al [phone].    We comply with applicable federal civil rights laws and [place] laws. We do not discriminate on the basis of race, color, national origin, age, disability, sex, sexual orientation, or gender identity.               Review of your medicines      START taking        Dose / Directions    acidophilus tablet   Used for:  Cellulitis of left hand        Dose:  1 tablet   Take 1 tablet by mouth 3 times daily for 15 days   Quantity:  45 tablet   Refills:  0       cephALEXin 500 MG capsule   Commonly known as:  KEFLEX   Used for:  Cellulitis of left hand        Dose:  500 mg   Take 1 capsule (500 mg) by mouth 4 times daily for 10 days   Quantity:  40 capsule   Refills:  0       clindamycin 300 MG capsule   Commonly known as:  CLEOCIN   Used for:  Cellulitis of left hand        Dose:  300 mg   Take 1 capsule (300 mg) by mouth 3 times daily for 10 days   Quantity:  30 capsule   Refills:  0         CONTINUE these medicines which have NOT CHANGED        Dose / Directions    acetaminophen 325 MG tablet   Commonly known as:  TYLENOL   Used for:  Hallux valgus (acquired), right foot        Dose:  650 mg   Take 2 tablets (650 mg) by mouth every 4 hours as needed for mild pain or fever   Quantity:  100 tablet   Refills:  0       CYMBALTA PO   Indication:  Major Depressive Disorder, Musculoskeletal  Pain        Dose:  120 mg   Take 120 mg by mouth daily   Refills:  0       hydrOXYzine 25 MG tablet   Commonly known as:  ATARAX   Used for:  Hallux valgus (acquired), right foot        Dose:  25 mg   Take 1 tablet (25 mg) by mouth every 6 hours as needed for itching (and nausea, spasms, adjuvant pain)   Quantity:  40 tablet   Refills:  0       IBUPROFEN PO        Dose:  800 mg   Take 800 mg by mouth every 8 hours as needed for moderate pain   Refills:  0       ondansetron 4 MG ODT tab   Commonly known as:  ZOFRAN-ODT   Used for:  Viral gastroenteritis        Dose:  4 mg   Take 1 tablet (4 mg) by mouth every 6 hours as needed for nausea   Quantity:  15 tablet   Refills:  0       oxyCODONE IR 5 MG tablet   Commonly known as:  ROXICODONE   Used for:  Hallux valgus (acquired), right foot        Dose:  5-15 mg   Take 1-3 tablets (5-15 mg) by mouth every 3 hours as needed for pain or other (Moderate to Severe)   Quantity:  75 tablet   Refills:  0       PROVIGIL PO        Dose:  400 mg   Take 400 mg by mouth every morning 2 x 200 mg tabs   Refills:  0       WELLBUTRIN XL PO        Dose:  150 mg   Take 150 mg by mouth every morning   Refills:  0       XANAX PO        Dose:  0.25 mg   Take 0.25 mg by mouth 4 times daily as needed for anxiety   Refills:  0         STOP taking     HYDROcodone-acetaminophen 5-325 MG per tablet   Commonly known as:  NORCO                Where to get your medicines      These medications were sent to [facility] - [address]  [address], [place] [address]     Phone:  [phone]     acidophilus tablet    cephALEXin 500 MG capsule    clindamycin 300 MG capsule               ANTIBIOTIC INSTRUCTION     You've Been Prescribed an Antibiotic - Now What?  Your healthcare team thinks that you or your loved one might have an infection. Some infections can be treated with antibiotics, which are powerful, life-saving drugs. Like all medications, antibiotics  have side effects and should only be used when necessary. There are some important things you should know about your antibiotic treatment.      Your healthcare team may run tests before you start taking an antibiotic.    Your team may take samples (e.g., from your blood, urine or other areas) to run tests to look for bacteria. These test can be important to determine if you need an antibiotic at all and, if you do, which antibiotic will work best.      Within a few days, your healthcare team might change or even stop your antibiotic.    Your team may start you on an antibiotic while they are working to find out what is making you sick.    Your team might change your antibiotic because test results show that a different antibiotic would be better to treat your infection.    In some cases, once your team has more information, they learn that you do not need an antibiotic at all. They may find out that you don't have an infection, or that the antibiotic you're taking won't work against your infection. For example, an infection caused by a virus can't be treated with antibiotics. Staying on an antibiotic when you don't need it is more likely to be harmful than helpful.      You may experience side effects from your antibiotic.    Like all medications, antibiotics have side effects. Some of these can be serious.    Let you healthcare team know if you have any known allergies when you are admitted to the hospital.    One significant side effect of nearly all antibiotics is the risk of severe and sometimes deadly diarrhea caused by Clostridium difficile (C. Difficile). This occurs when a person takes antibiotics because some good germs are destroyed. Antibiotic use allows C. diificile to take over, putting patients at high risk for this serious infection.    As a patient or caregiver, it is important to understand your or your loved one's antibiotic treatment. It is especially important for caregivers to speak up when  patients can't speak for themselves. Here are some important questions to ask your healthcare team.    What infection is this antibiotic treating and how do you know I have that infection?    What side effects might occur from this antibiotic?    How long will I need to take this antibiotic?    Is it safe to take this antibiotic with other medications or supplements (e.g., vitamins) that I am taking?     Are there any special directions I need to know about taking this antibiotic? For example, should I take it with food?    How will I be monitored to know whether my infection is responding to the antibiotic?    What tests may help to make sure the right antibiotic is prescribed for me?      Information provided by:  www.cdc.gov/getsmart  U.S. Department of Health and Human Services  Centers for disease Control and Prevention  National Center for Emerging and Zoonotic Infectious Diseases  Division of Healthcare Quality Promotion         Protect others around you: Learn how to safely use, store and throw away your medicines at www.disposemymeds.org.             Medication List: This is a list of all your medications and when to take them. Check marks below indicate your daily home schedule. Keep this list as a reference.      Medications           Morning Afternoon Evening Bedtime As Needed    acetaminophen 325 MG tablet   Commonly known as:  TYLENOL   Take 2 tablets (650 mg) by mouth every 4 hours as needed for mild pain or fever   Last time this was given:  975 mg on 11/3/2017  3:59 PM                                   acidophilus tablet   Take 1 tablet by mouth 3 times daily for 15 days            Take tomorrow morning 11/4/17       Take tomorrow afternoon 11/4/17       Take this evening 11/3/17               cephALEXin 500 MG capsule   Commonly known as:  KEFLEX   Take 1 capsule (500 mg) by mouth 4 times daily for 10 days            Take tomorrow morning 11/4/17       Take tomorrow afternoon 11/4/17       Take  tomorrow evening 11/4/17       Take tonight 11/3/17           clindamycin 300 MG capsule   Commonly known as:  CLEOCIN   Take 1 capsule (300 mg) by mouth 3 times daily for 10 days            Take tomorrow morning 11/4/17       Take tomorrow afternoon 11/4/17       Take tomorrow evening 11/4/17       Take tonight 11/3/17             CYMBALTA PO   Take 120 mg by mouth daily   Last time this was given:  120 mg on 11/3/2017  8:45 AM            Take tomorrow morning 11/4/17                         hydrOXYzine 25 MG tablet   Commonly known as:  ATARAX   Take 1 tablet (25 mg) by mouth every 6 hours as needed for itching (and nausea, spasms, adjuvant pain)                                   IBUPROFEN PO   Take 800 mg by mouth every 8 hours as needed for moderate pain                                   ondansetron 4 MG ODT tab   Commonly known as:  ZOFRAN-ODT   Take 1 tablet (4 mg) by mouth every 6 hours as needed for nausea                                   oxyCODONE IR 5 MG tablet   Commonly known as:  ROXICODONE   Take 1-3 tablets (5-15 mg) by mouth every 3 hours as needed for pain or other (Moderate to Severe)   Last time this was given:  5 mg on 11/3/2017  6:52 AM                                   PROVIGIL PO   Take 400 mg by mouth every morning 2 x 200 mg tabs   Last time this was given:  400 mg on 11/3/2017  8:45 AM            Take tomorrow morning 11/4/17                         WELLBUTRIN XL PO   Take 150 mg by mouth every morning   Last time this was given:  150 mg on 11/3/2017  8:45 AM            Take tomorrow morning 11/4/17                         XANAX PO   Take 0.25 mg by mouth 4 times daily as needed for anxiety

## 2017-10-31 NOTE — IP AVS SNAPSHOT
[facility] [address] [facility]    [address] [place] [address]    Phone:  [phone]                                       After Visit Summary   10/31/2017    Kelsie Liang    MRN: [id number]           After Visit Summary Signature Page     I have received my discharge instructions, and my questions have been answered. I have discussed any challenges I see with this plan with the nurse or doctor.    ..........................................................................................................................................  Patient/Patient Representative Signature      ..........................................................................................................................................  Patient Representative Print Name and Relationship to Patient    ..................................................               ................................................  Date                                            Time    ..........................................................................................................................................  Reviewed by Signature/Title    ...................................................              ..............................................  Date                                                            Time

## 2017-11-01 PROBLEM — T81.40XA POST-OPERATIVE INFECTION: Status: ACTIVE | Noted: 2017-11-01

## 2017-11-01 LAB
ERYTHROCYTE [DISTWIDTH] IN BLOOD BY AUTOMATED COUNT: 12.6 % (ref 10–15)
HCT VFR BLD AUTO: 32.8 % (ref 35–47)
HGB BLD-MCNC: 11 G/DL (ref 11.7–15.7)
MCH RBC QN AUTO: 31.8 PG (ref 26.5–33)
MCHC RBC AUTO-ENTMCNC: 33.5 G/DL (ref 31.5–36.5)
MCV RBC AUTO: 95 FL (ref 78–100)
MRSA DNA SPEC QL NAA+PROBE: NEGATIVE
PLATELET # BLD AUTO: 274 10E9/L (ref 150–450)
RBC # BLD AUTO: 3.46 10E12/L (ref 3.8–5.2)
SPECIMEN SOURCE: NORMAL
WBC # BLD AUTO: 7 10E9/L (ref 4–11)

## 2017-11-01 PROCEDURE — G0378 HOSPITAL OBSERVATION PER HR: HCPCS

## 2017-11-01 PROCEDURE — 25000132 ZZH RX MED GY IP 250 OP 250 PS 637: Performed by: INTERNAL MEDICINE

## 2017-11-01 PROCEDURE — 96375 TX/PRO/DX INJ NEW DRUG ADDON: CPT

## 2017-11-01 PROCEDURE — 12000000 ZZH R&B MED SURG/OB

## 2017-11-01 PROCEDURE — 85027 COMPLETE CBC AUTOMATED: CPT | Performed by: INTERNAL MEDICINE

## 2017-11-01 PROCEDURE — 36415 COLL VENOUS BLD VENIPUNCTURE: CPT | Performed by: INTERNAL MEDICINE

## 2017-11-01 PROCEDURE — 25000128 H RX IP 250 OP 636: Performed by: INTERNAL MEDICINE

## 2017-11-01 PROCEDURE — 99233 SBSQ HOSP IP/OBS HIGH 50: CPT | Performed by: INTERNAL MEDICINE

## 2017-11-01 PROCEDURE — 25000128 H RX IP 250 OP 636: Performed by: PHYSICIAN ASSISTANT

## 2017-11-01 PROCEDURE — 87641 MR-STAPH DNA AMP PROBE: CPT | Performed by: INTERNAL MEDICINE

## 2017-11-01 PROCEDURE — S0077 INJECTION, CLINDAMYCIN PHOSP: HCPCS | Performed by: INTERNAL MEDICINE

## 2017-11-01 PROCEDURE — 99207 ZZC CDG-MDM COMPONENT: MEETS MODERATE - UP CODED: CPT | Performed by: INTERNAL MEDICINE

## 2017-11-01 PROCEDURE — 87640 STAPH A DNA AMP PROBE: CPT | Performed by: INTERNAL MEDICINE

## 2017-11-01 PROCEDURE — 25000125 ZZHC RX 250: Performed by: INTERNAL MEDICINE

## 2017-11-01 RX ORDER — ACETAMINOPHEN 325 MG/1
650 TABLET ORAL EVERY 4 HOURS PRN
Status: DISCONTINUED | OUTPATIENT
Start: 2017-11-01 | End: 2017-11-03 | Stop reason: HOSPADM

## 2017-11-01 RX ORDER — ALPRAZOLAM 0.25 MG
0.25 TABLET ORAL 3 TIMES DAILY PRN
Status: DISCONTINUED | OUTPATIENT
Start: 2017-11-01 | End: 2017-11-03 | Stop reason: HOSPADM

## 2017-11-01 RX ORDER — HYDROMORPHONE HYDROCHLORIDE 1 MG/ML
0.5 INJECTION, SOLUTION INTRAMUSCULAR; INTRAVENOUS; SUBCUTANEOUS ONCE
Status: DISCONTINUED | OUTPATIENT
Start: 2017-11-01 | End: 2017-11-02

## 2017-11-01 RX ORDER — BUPROPION HYDROCHLORIDE 150 MG/1
150 TABLET ORAL EVERY MORNING
Status: DISCONTINUED | OUTPATIENT
Start: 2017-11-02 | End: 2017-11-03 | Stop reason: HOSPADM

## 2017-11-01 RX ORDER — ACETAMINOPHEN 325 MG/1
650 TABLET ORAL EVERY 4 HOURS PRN
Status: DISCONTINUED | OUTPATIENT
Start: 2017-11-01 | End: 2017-11-01

## 2017-11-01 RX ORDER — OXYCODONE HYDROCHLORIDE 5 MG/1
5-10 TABLET ORAL
Status: DISCONTINUED | OUTPATIENT
Start: 2017-11-01 | End: 2017-11-03 | Stop reason: HOSPADM

## 2017-11-01 RX ORDER — CEFAZOLIN SODIUM 2 G/100ML
2 INJECTION, SOLUTION INTRAVENOUS EVERY 8 HOURS
Status: DISCONTINUED | OUTPATIENT
Start: 2017-11-01 | End: 2017-11-03 | Stop reason: HOSPADM

## 2017-11-01 RX ORDER — ACETAMINOPHEN 650 MG/1
650 SUPPOSITORY RECTAL EVERY 4 HOURS PRN
Status: DISCONTINUED | OUTPATIENT
Start: 2017-11-01 | End: 2017-11-01

## 2017-11-01 RX ORDER — ONDANSETRON 4 MG/1
4 TABLET, ORALLY DISINTEGRATING ORAL EVERY 6 HOURS PRN
Status: DISCONTINUED | OUTPATIENT
Start: 2017-11-01 | End: 2017-11-03 | Stop reason: HOSPADM

## 2017-11-01 RX ORDER — HYDROMORPHONE HYDROCHLORIDE 1 MG/ML
.3-.5 INJECTION, SOLUTION INTRAMUSCULAR; INTRAVENOUS; SUBCUTANEOUS
Status: DISCONTINUED | OUTPATIENT
Start: 2017-11-01 | End: 2017-11-01

## 2017-11-01 RX ORDER — ONDANSETRON 2 MG/ML
4 INJECTION INTRAMUSCULAR; INTRAVENOUS EVERY 6 HOURS PRN
Status: DISCONTINUED | OUTPATIENT
Start: 2017-11-01 | End: 2017-11-03 | Stop reason: HOSPADM

## 2017-11-01 RX ORDER — MODAFINIL 200 MG/1
400 TABLET ORAL EVERY MORNING
Status: DISCONTINUED | OUTPATIENT
Start: 2017-11-02 | End: 2017-11-03 | Stop reason: HOSPADM

## 2017-11-01 RX ORDER — NALOXONE HYDROCHLORIDE 0.4 MG/ML
.1-.4 INJECTION, SOLUTION INTRAMUSCULAR; INTRAVENOUS; SUBCUTANEOUS
Status: DISCONTINUED | OUTPATIENT
Start: 2017-11-01 | End: 2017-11-03 | Stop reason: HOSPADM

## 2017-11-01 RX ORDER — DULOXETIN HYDROCHLORIDE 60 MG/1
120 CAPSULE, DELAYED RELEASE ORAL DAILY
Status: DISCONTINUED | OUTPATIENT
Start: 2017-11-01 | End: 2017-11-03 | Stop reason: HOSPADM

## 2017-11-01 RX ORDER — CEFTRIAXONE 1 G/1
1 INJECTION, POWDER, FOR SOLUTION INTRAMUSCULAR; INTRAVENOUS EVERY 24 HOURS
Status: DISCONTINUED | OUTPATIENT
Start: 2017-11-01 | End: 2017-11-01

## 2017-11-01 RX ORDER — CLINDAMYCIN PHOSPHATE 900 MG/50ML
900 INJECTION, SOLUTION INTRAVENOUS EVERY 8 HOURS
Status: DISCONTINUED | OUTPATIENT
Start: 2017-11-01 | End: 2017-11-03 | Stop reason: HOSPADM

## 2017-11-01 RX ORDER — HYDROMORPHONE HYDROCHLORIDE 1 MG/ML
.3-.5 INJECTION, SOLUTION INTRAMUSCULAR; INTRAVENOUS; SUBCUTANEOUS
Status: DISCONTINUED | OUTPATIENT
Start: 2017-11-01 | End: 2017-11-03 | Stop reason: HOSPADM

## 2017-11-01 RX ORDER — HYDROCODONE BITARTRATE AND ACETAMINOPHEN 5; 325 MG/1; MG/1
1-2 TABLET ORAL EVERY 4 HOURS PRN
Status: DISCONTINUED | OUTPATIENT
Start: 2017-11-01 | End: 2017-11-01

## 2017-11-01 RX ORDER — ACETAMINOPHEN 325 MG/1
975 TABLET ORAL 3 TIMES DAILY
Status: DISCONTINUED | OUTPATIENT
Start: 2017-11-01 | End: 2017-11-03 | Stop reason: HOSPADM

## 2017-11-01 RX ADMIN — CLINDAMYCIN PHOSPHATE 900 MG: 18 INJECTION, SOLUTION INTRAVENOUS at 16:00

## 2017-11-01 RX ADMIN — TAZOBACTAM SODIUM AND PIPERACILLIN SODIUM 3.38 G: 375; 3 INJECTION, SOLUTION INTRAVENOUS at 13:21

## 2017-11-01 RX ADMIN — DULOXETINE HYDROCHLORIDE 120 MG: 30 CAPSULE, DELAYED RELEASE ORAL at 08:27

## 2017-11-01 RX ADMIN — CLINDAMYCIN PHOSPHATE 900 MG: 18 INJECTION, SOLUTION INTRAVENOUS at 21:42

## 2017-11-01 RX ADMIN — OXYCODONE HYDROCHLORIDE 5 MG: 5 TABLET ORAL at 20:11

## 2017-11-01 RX ADMIN — HYDROCODONE BITARTRATE AND ACETAMINOPHEN 1 TABLET: 5; 325 TABLET ORAL at 00:13

## 2017-11-01 RX ADMIN — HYDROMORPHONE HYDROCHLORIDE 0.5 MG: 1 INJECTION, SOLUTION INTRAMUSCULAR; INTRAVENOUS; SUBCUTANEOUS at 11:04

## 2017-11-01 RX ADMIN — OXYCODONE HYDROCHLORIDE 5 MG: 5 TABLET ORAL at 23:26

## 2017-11-01 RX ADMIN — CEFAZOLIN SODIUM 2 G: 2 INJECTION, SOLUTION INTRAVENOUS at 17:35

## 2017-11-01 RX ADMIN — HYDROCODONE BITARTRATE AND ACETAMINOPHEN 2 TABLET: 5; 325 TABLET ORAL at 09:03

## 2017-11-01 RX ADMIN — ACETAMINOPHEN 975 MG: 325 TABLET, FILM COATED ORAL at 15:59

## 2017-11-01 RX ADMIN — HYDROMORPHONE HYDROCHLORIDE 0.5 MG: 1 INJECTION, SOLUTION INTRAMUSCULAR; INTRAVENOUS; SUBCUTANEOUS at 14:12

## 2017-11-01 RX ADMIN — OXYCODONE HYDROCHLORIDE 5 MG: 5 TABLET ORAL at 16:56

## 2017-11-01 RX ADMIN — HYDROCODONE BITARTRATE AND ACETAMINOPHEN 2 TABLET: 5; 325 TABLET ORAL at 04:50

## 2017-11-01 RX ADMIN — HYDROMORPHONE HYDROCHLORIDE 0.5 MG: 1 INJECTION, SOLUTION INTRAMUSCULAR; INTRAVENOUS; SUBCUTANEOUS at 08:25

## 2017-11-01 RX ADMIN — HYDROMORPHONE HYDROCHLORIDE 0.5 MG: 1 INJECTION, SOLUTION INTRAMUSCULAR; INTRAVENOUS; SUBCUTANEOUS at 16:08

## 2017-11-01 RX ADMIN — ACETAMINOPHEN 975 MG: 325 TABLET, FILM COATED ORAL at 21:41

## 2017-11-01 RX ADMIN — HYDROMORPHONE HYDROCHLORIDE 0.5 MG: 1 INJECTION, SOLUTION INTRAMUSCULAR; INTRAVENOUS; SUBCUTANEOUS at 21:41

## 2017-11-01 RX ADMIN — HYDROCODONE BITARTRATE AND ACETAMINOPHEN 2 TABLET: 5; 325 TABLET ORAL at 12:46

## 2017-11-01 RX ADMIN — HYDROMORPHONE HYDROCHLORIDE 0.5 MG: 1 INJECTION, SOLUTION INTRAMUSCULAR; INTRAVENOUS; SUBCUTANEOUS at 18:14

## 2017-11-01 NOTE — PLAN OF CARE
Problem: Patient Care Overview  Goal: Plan of Care/Patient Progress Review  Outcome: No Change  -diagnostic tests and consults completed and resulted ; partially met     -vital signs normal or at patient baseline-Met     Nurse to notify provider when observation goals have been met and patient is ready for discharge.

## 2017-11-01 NOTE — CONSULTS
[facility]    Infectious Disease Consultation     Date of Admission:  10/31/2017  Date of Consult (When I saw the patient): 11/01/17    Assessment & Plan   Kelsie Liang is a 62 year old female who was admitted on 10/31/2017.     Impression:  1. 62 y.o admitted with pain and redness at the site of recent surgery.   2. Approximately 3.5 weeks ago she sustained spiral fractures of her third and fourth proximal phalanx, s/p surgical pinning by Dr. Goldberg GEOFFREY on 10/12/17.  3. Was on ceftriaxone and later zosyn    Recommendations:   Will get MRSA PCR from the nares.   Switch to ancef and clindamycin.   Will follow.         Karlos Noonan MD    Reason for Consult   Reason for consult: I was asked by Brad RESENDEZ to evaluate this patient for post surgical infection.    Primary Care Physician   Iona Gabriel    Chief Complaint   Pain and redness on the site of recent hand surgery.     History is obtained from the patient and medical records    History of Present Illness   Kelsie Liang is a 62 year old  female who presented for evaluation of left hand pain. 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 She was subsequently evaluated at the [facility] in [place] and was found to have comminuted spiral fractures of her third and fourth proximal phalanx. Subsequently, she underwent surgical pinning by Dr. Mcclellan on 10/12/17. Since that time she has been taking her pain medications as prescribed and doing hand therapy. 2 days ago she noticed increased pain and swelling to her left hand and some redness. Was started on ceftriaxone.     Past Medical History   I have reviewed this patient's medical history and updated it with pertinent information if needed.   Past Medical History:    Diagnosis Date     Anxiety      Arthritis      Bursitis     bilat knees     Concussion 1/28/2016    FELL ON THE ICE     CPAP (continuous positive airway pressure) dependence      Depressive disorder      Gastro-oesophageal reflux disease      Heart murmur      Hematuria      Kidney stone      Sleep apnea     CPAP       Past Surgical History   I have reviewed this patient's surgical history and updated it with pertinent information if needed.  Past Surgical History:   Procedure Laterality Date     BUNIONECTOMY VIRI, REPAIR HAMMER TOE(S), COMBINED Right 12/9/2016    Procedure: COMBINED BUNIONECTOMY LALITHA, REPAIR HAMMER TOE(S);  Surgeon: Jose Massey MD;  Location:  OR     KNEE SURGERY       LAPAROSCOPIC CHOLECYSTECTOMY  12/3/2013    Procedure: LAPAROSCOPIC CHOLECYSTECTOMY;  LAPAROSCOPIC CHOLECYSTECTOMY ;  Surgeon: Dian Landeros MD;  Location: [facility]     ORTHOPEDIC SURGERY  2009,2010     OSTEOTOMY FOOT Right 12/9/2016    Procedure: OSTEOTOMY FOOT;  Surgeon: Jose Massey MD;  Location:  OR     SHOULDER SURGERY       TONSILLECTOMY         Prior to Admission Medications   Prior to Admission Medications   Prescriptions Last Dose Informant Patient Reported? Taking?   ALPRAZolam (XANAX PO) Past Month at P{RN Self Yes Yes   Sig: Take 0.25 mg by mouth 4 times daily as needed for anxiety   BuPROPion HCl (WELLBUTRIN XL PO) 10/31/2017 at AM Self Yes Yes   Sig: Take 150 mg by mouth every morning   DULoxetine HCl (CYMBALTA PO) 10/30/2017 at HS Self Yes Yes   Sig: Take 120 mg by mouth daily    HYDROcodone-acetaminophen (NORCO) 5-325 MG per tablet  at PRN Self No Yes   Sig: Take 1 tablet by mouth every 4 hours as needed for moderate to severe pain   IBUPROFEN PO 10/31/2017 at Unknown time Self Yes Yes   Sig: Take 800 mg by mouth every 8 hours as needed for moderate pain   Modafinil (PROVIGIL PO) 10/31/2017 at A> Self Yes Yes   Sig: Take 400 mg by mouth every morning 2 x 200 mg tabs   acetaminophen  (TYLENOL) 325 MG tablet 10/31/2017 at Unknown time Self No Yes   Sig: Take 2 tablets (650 mg) by mouth every 4 hours as needed for mild pain or fever   hydrOXYzine (ATARAX) 25 MG tablet  Self No No   Sig: Take 1 tablet (25 mg) by mouth every 6 hours as needed for itching (and nausea, spasms, adjuvant pain)   ondansetron (ZOFRAN-ODT) 4 MG disintegrating tablet Past Month at PRN Self No Yes   Sig: Take 1 tablet (4 mg) by mouth every 6 hours as needed for nausea   oxyCODONE (ROXICODONE) 5 MG IR tablet 10/31/2017 at x2 Self No Yes   Sig: Take 1-3 tablets (5-15 mg) by mouth every 3 hours as needed for pain or other (Moderate to Severe)      Facility-Administered Medications: None     Allergies   Allergies   Allergen Reactions     Mobic [Meloxicam]      Analgesics-antiinflammatory=night swets       Immunization History     There is no immunization history on file for this patient.    Social History   I have reviewed this patient's social history and updated it with pertinent information if needed. Kelsie Liang  reports that she has never smoked. She does not have any smokeless tobacco history on file. She reports that she drinks alcohol. She reports that she does not use illicit drugs.    Family History   I have reviewed this patient's family history and updated it with pertinent information if needed.   No family history on file.    Review of Systems   The 10 point Review of Systems is negative other than noted in the HPI or here.     Physical Exam   Temp: 97.2  F (36.2  C) Temp src: Oral BP: 103/62 Pulse: 91 Heart Rate: 83 Resp: 16 SpO2: 96 % O2 Device: None (Room air)    Vital Signs with Ranges  Temp:  [97.2  F (36.2  C)-98.2  F (36.8  C)] 97.2  F (36.2  C)  Pulse:  [] 91  Heart Rate:  [] 83  Resp:  [16] 16  BP: (103-161)/(62-98) 103/62  SpO2:  [96 %-98 %] 96 %  152 lbs 4.8 oz    GENERAL APPEARANCE:  alert and no distress  EYES: Eyes grossly normal to inspection, PERRL and conjunctivae and  sclerae normal  HENT: ear canals and TM's normal and nose and mouth without ulcers or lesions  NECK: no adenopathy, no asymmetry, masses, or scars and thyroid normal to palpation  RESP: lungs clear to auscultation - no rales, rhonchi or wheezes  CV: regular rates and rhythm, normal S1 S2, no S3 or S4 and no murmur, click or rub  LYMPHATICS: normal ant/post cervical and supraclavicular nodes  ABDOMEN: soft, nontender, without hepatosplenomegaly or masses and bowel sounds normal  MS: swelling and redness around the pins, no discharge   SKIN: no suspicious lesions or rashes      Data   Lab Results   Component Value Date    WBC 7.0 11/01/2017    HGB 11.0 (L) 11/01/2017    HCT 32.8 (L) 11/01/2017     11/01/2017     10/31/2017    POTASSIUM 3.9 10/31/2017    CHLORIDE 109 10/31/2017    CO2 25 10/31/2017    BUN 16 10/31/2017    CR 0.70 10/31/2017     (H) 10/31/2017    SED 15 10/31/2017    TROPI <0.012 11/15/2013    AST 18 12/07/2015    ALT 31 12/07/2015    ALKPHOS 164 (H) 12/07/2015    BILITOTAL 0.3 12/07/2015     No results for input(s): CULT in the last 168 hours.  Recent Labs   Lab Test  09/03/17 2028 09/05/13   1520   CULT  50,000 to 100,000 colonies/mL  Escherichia coli  *  No growth

## 2017-11-01 NOTE — ED NOTES
Patient had surgery on 3rd and 4th fingers of left hand 3 weeks ago. Today she is having increased pain and is worried about infection.

## 2017-11-01 NOTE — ED NOTES
.  [facility]  ED Nurse Handoff Report    ED Chief complaint: Hand Pain      ED Diagnosis:   Final diagnoses:   Cellulitis of left hand       Code Status: Full Code    Allergies:   Allergies   Allergen Reactions     Mobic [Meloxicam]      Analgesics-antiinflammatory=night swets       Activity level - Baseline/Home:  Independent    Activity Level - Current:   Independent     Needed?: No    Isolation: No  Infection: Not Applicable    Bariatric?: No    Vital Signs:   Vitals:    10/31/17 2128   BP: (!) 161/98   Pulse: 110   Resp: 16   Temp: 98.1  F (36.7  C)   TempSrc: Oral   SpO2: 98%   Weight: 68 kg (150 lb)   Height: 1.524 m (5')       Cardiac Rhythm: ,        Pain level: 0-10 Pain Scale: 7    Is this patient confused?: No    Patient Report: Initial Complaint: Hand Pain  Focused Assessment: left hand surgery for 2 spiral fractures of 3rd and 4th metacarpals on 10/12. Has been healing well and going to hand therapy but noted the last day to have increased pain, swelling, and redness. Pins visible on posterior knuckle and in place.  Tests Performed: xray and blood  Abnormal Results: CRP elevated  Treatments provided: IV rocephin and oral norco (1)    Family Comments: came alone    OBS brochure/video discussed/provided to patient: Yes    ED Medications:   Medications   cefTRIAXone (ROCEPHIN) 1 g vial to attach to  mL bag for ADULTS or NS 50 mL bag for PEDS (1 g Intravenous New Bag 10/31/17 2240)   HYDROcodone-acetaminophen (NORCO) 5-325 MG per tablet 1-2 tablet (1 tablet Oral Given 10/31/17 2205)       Drips infusing?:  No      ED NURSE PHONE NUMBER: *[phone]

## 2017-11-01 NOTE — PLAN OF CARE
Problem: Patient Care Overview  Goal: Plan of Care/Patient Progress Review  Outcome: No Change  Pt a/o. VSS. Medicated for pain in left hand. Redness present. Border marked. Up adlib. Tolerating reg diet. Transfer to station 66.

## 2017-11-01 NOTE — PROGRESS NOTES
SPIRITUAL HEALTH SERVICES Progress Note  [facility]    Consult order states that pt requests visit from a .   has written a referral for Fr. Williamson to visit pt per this order.                                                                                                                                           Gualberto Mullins M.Div., [facility]  Staff   Pager [phone]

## 2017-11-01 NOTE — PLAN OF CARE
Problem: Patient Care Overview  Goal: Plan of Care/Patient Progress Review  Outcome: No Change  -diagnostic tests and consults completed and resulted   -vital signs normal or at patient baseline-Met   Nurse to notify provider when observation goals have been met and patient is ready for discharge.     Pt is A&O X4. Pain is not controled well. Pt has been receiving analgesic but pain is still an issue. Pt left had is still edemic , red, and warm. Ice was provided to ppt. Pt is up with SBA. Voiding well. Continue to monitor.

## 2017-11-01 NOTE — ED PROVIDER NOTES
History     Chief Complaint:  Hand Pain    HPI   Kelsie Liang is a 62 year old right-handed female who presents to the emergency department today for evaluation of left hand pain. 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 She was subsequently evaluated at the [facility] in [place] and was found to have comminuted spiral fractures of her third and fourth proximal phalanx. Subsequently, she underwent surgical pinning by Dr. Gardner of [facility] on 10/12/17. Since that time she has been taking her pain medications as prescribed and doing hand therapy. She felt like she was excelling until 2 days ago when she noticed increased pain and swelling to her left hand. Today, she continued to experience immense pain and then noted some redness to the hand, prompting her evaluation. She denies any fever, nausea, vomiting, or any other acute symptoms. She denies any numbness or tingling. She does state that her pulse is frequently on the high side. She has no other complaints, but is concerned for infection.     Allergies:  Mobic [Meloxicam]      Medications:    BuPROPion HCl (WELLBUTRIN XL PO)  HYDROcodone-acetaminophen (NORCO) 5-325 MG per tablet  ALPRAZolam (XANAX PO)  DULoxetine HCl (CYMBALTA PO)  Modafinil (PROVIGIL PO)  hydrOXYzine (ATARAX) 25 MG tablet     Past Medical History:    Anxiety  Arthritis  Bursitis  Concussion  CPAP  Depressive disorder  GERD  Heart Murmer  Hematuria   Kidney Stone  Sleep apnea     Past Surgical History:    Left hand surgery (10/12/17)  Bunionectomy francesco, repair hammer toe(s) combined  Knee surgery  Laparoscopic cholecystectomy  Orthopedic surgery  Osteotomy foot  Shoulder surgery  Tonsillectomy     Family History:    The patient denies any relevant family medical history.       Social History:  The patient was accompanied to the ED by .  Smoking Status: No  Smokeless Tobacco: N/A  Alcohol Use: Yes   Marital Status:   [2]     Review of Systems   Constitutional: Negative for fever.   Gastrointestinal: Negative for nausea and vomiting.   Musculoskeletal: Positive for arthralgias (left hand) and joint swelling (left hand).   Skin: Positive for color change (left hand redness).   All other systems reviewed and are negative.    Physical Exam   First Vitals:  BP: (!) 161/98  Pulse: 110  Heart Rate: 110  Temp: 98.1  F (36.7  C)  Resp: 16  Height: 152.4 cm (5')  Weight: 68 kg (150 lb)  SpO2: 98 %    Physical Exam  General: Laying in bed.  Alert and oriented. Appears uncomfortable. Tearful.   Head:  The scalp, face, and head appear normal   Eyes:  The pupils are equal, round, and reactive to light     Extraocular muscles are intact    Conjunctivae and sclerae are normal    CV:  Regular rate and rhythm     Normal S1/S2    No pathological murmur detected     Radial pulses intact    Capillary refill brisk  Resp:  Lungs are clear to auscultation    Non-labored    No rales or wheezing   GI:  Abdomen is soft, non-distended    No rebound tenderness     Normal bowel sounds   MSK:   Fingers are held in passive flexion. Swelling to the distal 2nd, 3rd and 4th fingers. Pain with passive ROM of fingers. The patient can only minimally flex the fingers with great pain.   Skin:  Obvious swelling and erythema to the proximal phalanges and metatarsal heads of the 2nd, 3rd, and 4th digits.  Neuro: Distal sensation intact.     Emergency Department Course     Imaging:  Radiology findings were communicated with the patient who voiced understanding of the findings.  XR Hand Left 3 Views  There are surgical changes of K wires spanning fractures  of the proximal phalanges of both the middle and ring fingers. The  fractures are in near anatomic alignment and the hardware is intact.  There appears to be an  old healed fracture through the base of the  fourth metacarpal. No other definite fracture is seen. There are  moderate degenerative changes of the first carpometacarpal joint. No  other abnormality is noted.   ADITYA LI MD    Laboratory:  Laboratory findings were communicated with the patient who voiced understanding of the findings.  Erythrocyte sedimentation rate auto: 15  CRP Inflammation: 17.4 (H)   CBC: WBC 8.9, HGB 11.6 (L),   BMP: Glucose 132 (H), o/w WNL (Creatinine 0.70)    Interventions:  2205 Norco 1 tablet PO  2240 Rocephin 1 g IV     Emergency Department Course:  Nursing notes and vitals reviewed.  I entered the room.  I performed an exam of the patient as documented above.   IV was inserted and blood was drawn for laboratory testing, results above.  The patient was sent for x-ray while in the emergency department, results above.    The patient received the above intervention(s).    I spoke with Dr. Berrios of the hand surgery service regarding patient's presentation, findings, and plan of care.   The patient was rechecked and she was updated on the results of her laboratory and imaging studies.    I spoke with Dr. Hodge of the hospitalist service regarding patient's presentation, findings, and plan of care.   I discussed the treatment plan with the patient. They expressed understanding of this plan and consented to admission. I discussed the patient with Dr. Hodge, who will admit the patient to a monitored bed for further evaluation and treatment.   I personally reviewed the laboratory results with the Patient and answered all related questions prior to discharge.    Impression & Plan      Medical Decision Making:  Kelsie Liang is a 62 year old female who presents to the emergency department today for evaluation of left hand pain. She recently underwent surgery on 10/12 by Dr. Alexander Gardner at [facility] for a comminuted 3rd and 4th proximal phalanx fracture. Today, she noticed  pain, swelling, and redness. On examination, there is diffuse swelling to the 2nd, 3rd, and 4th digits. The digits are held in partial flexion. There is pain on passive range of motion of the fingers. There is concern for tenosynovitis and/or cellulitis. CBC WNL. BMP unremarkable. CRP elevated and ESR WNL. X-ray demonstrates that the surgical K wires are in anatomic alignment and are otherwise unremarkable. No evidence of bony destruction. Dr. Berrios of hand surgery was contacted for consultation and agreed IV antibiotics and an observations stay would be appropriate. The patient was given oral pain medications and started on IV ceftriaxone prior to admission. Therefore, Dr. Hodge of the hospitalist service was contacted and agreed to admission of this patient to observation with hand surgery consultation in the morning.  All question were answered prior to admission.     Diagnosis:    ICD-10-CM    1. Cellulitis of left hand L03.114 CRP inflammation     CBC with platelets differential     CBC with platelets differential     Erythrocyte sedimentation rate auto     Erythrocyte sedimentation rate auto     Disposition:   The patient was admitted to the hospital for further evaluation and care.     Scribe Disclosure:  I, Preet Thompson, am serving as a scribe on 10/31/2017 to document services personally performed by the below provider based on my observations and the provider's statements to me.     Valerie Tucker  10/31/2017    EMERGENCY DEPARTMENT       Valerie Tucker PA-C  10/31/17 [id number]

## 2017-11-01 NOTE — H&P
PRIMARY CARE PHYSICIAN:  Iona Gabriel MD      CHIEF COMPLAINT:  Left hand postoperative infection.      HISTORY OF PRESENT ILLNESS:  Kelsie Liang is a very pleasant 62-year-old female with a history of anxiety, obstructive sleep apnea, and kidney stones.  She had a surgery in [place], [place], at the [facility] on 10/12/2017, when she had proximal phalanx fractures of the third and fourth fingers of the left hand, requiring four pins.  She said that one of these was a spiral fracture, and the other was a comminuted fracture.  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      The patient was doing well.  However, yesterday she noticed that she had increased swelling and pain, and also some redness which was new, and because of this, the patient presented to the emergency room.  She denies any fevers.  She denies that she had any additional trauma to this area.      In the emergency room, the patient was noted to be slightly tachycardic, but otherwise no fever.  They contacted Dr. Berrios from Orthopedics, who says he will see the patient in the morning.  They also started the patient on Rocephin.  The patient had a white count of 8.9, and a hemoglobin of 11.6.      PAST MEDICAL HISTORY:   1.  History of anxiety and depression.   2.  History of obstructive sleep apnea, for which the patient wears CPAP.  This was diagnosed in 1996.   3.  History of a kidney stone which occurred in 09/2017.   4.  History of chronic benign hematuria, for which the patient has been in evaluation including a cystoscopy.   5.  History of a cardiac murmur, for which the patient had a cardiac echo done at outside facility.  No results available.      PAST SURGICAL HISTORY:   1.  History of recent pinning of third and fourth  phalanges at [facility], 10/12/2017.   2.  History of bunionectomy and hammertoe, bilateral feet.   3.  History of arthroscopy of a knee.   4.  History of laparoscopic cholecystectomy.   5.  History of osteotomy, bilateral feet.   6.  History of shoulder surgery.   7.  History of tonsillectomy.      ALLERGIES:  Mobic.      MEDICATIONS:  Need to be reconciled, but appear to be Cymbalta, Provigil, Zofran, Xanax p.r.n., Ambien p.r.n., hydroxyzine, Senna, Roxicodone, and Tylenol.      FAMILY HISTORY:  No relevant family history.  The patient's mother did die in her 80s of Alzheimer's.  Father  of an MI and was a smoker.      SOCIAL HISTORY:  The patient is .  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  She also has a hobby of dog sledding, for which she has four dogs in the city, but [place] to [place] for [place] where the accident occurred.  She is not a smoker.  She drinks 2-3 alcoholic beverages a week.      REVIEW OF SYSTEMS:  Ten-point review of systems was done and is negative except as in history of present illness.      PHYSICAL EXAM:   VITAL SIGNS:  Blood pressure 130/84, pulse 111, respiratory rate 18, temperature 98.2.   HEENT:  Pupils are equal.  Extraocular movements are intact.  Pharynx without erythema.   NECK:  Supple.   CHEST:  Clear to auscultation.   CARDIOVASCULAR:  Regular rate and rhythm, normal S1-S2.   ABDOMEN:  Soft and nontender.   EXTREMITIES:  No edema.  In her left hand, there are four pins that are visible.  She has sausage-shaped fingers, but also some swelling over the metacarpophalangeal joints with some overlying erythema which is mild.      LABS:  White count 8.9, hemoglobin 11.6, platelet count 278,000.  Sodium 141, potassium 3.9, chloride 109, bicarbonate 25, BUN 16, creatinine 0.70.  CRP inflammation 17.4.  Glucose 132.      X-ray of the hand shows surgical changes of K-wires spanning fractures of the proximal phalanges of both middle and ring fingers.   The fractures are in near anatomic alignment with the hardware intact.  There appears to be an old healed fracture through the base of the fourth metacarpal.  No other definite fractures are seen.  Moderate degenerative changes of the first carpometacarpal joint.  No other abnormality noted.      ASSESSMENT:  Kelsie Liang is a very pleasant 62-year-old female with a history of kidney stone, anxiety, depression, and obstructive sleep apnea for which she wears a CPAP, who had an accident with her dogs, and required surgery with four pins on 10/12/2017 at the [facility], and yesterday the patient started noticing increasing swelling and pain as well as some slight erythema on the dorsum of the MCP area.  The patient came to the ER with concern for infection postoperatively.   1.  Postoperative changes concerning for infection:  The patient will be admitted under observation status.  Started on Rocephin and we will continue this.  Dr. Berrios was contacted by the ER, and will see the patient in the morning.  We will hold on any further imaging and let Dr. Berrios decide if he wants any other imaging.   2.  History of obstructive sleep apnea:  The patient did bring along her CPAP machine and we will have her use this.   3.  History of anxiety and depression:  The patient says she needs her Cymbalta tonight, and we will order this.   4.  History of benign hematuria, evaluated in the past.   5.  History of a kidney stone in 2017.   6.  CODE STATUS:  FULL CODE.   7.  DVT prophylaxis:  The patient will be ambulatory.   8.  Disposition:  The patient will be admitted under observation status.         RAGHU CERVANTES MD             D: 10/31/2017 23:25   T: 2017 01:18   MT: [id number]      Name:     KELSIE MORALES   MRN:      [id number]        Account:      [id number]   :      1955           Admitted:     [id number]      Document: [id number]       cc: Kamaljit Berrios MD        Iona Gabriel MD

## 2017-11-01 NOTE — PROGRESS NOTES
S:  Doing stable overall  Pain controlled.    O:  LUE hand swollen but no pockets of purulence noted.  Pins in place.      A/P:    Stable.  With cellulitis LUE.    IV antibiotics for another day.  Re-evaluate tomorrow.  Most likely discharge tomorrow.

## 2017-11-01 NOTE — PHARMACY-ADMISSION MEDICATION HISTORY
Admission Medication History    Admission medication history interview status for the 10/31/2017 admission is complete. See EPIC admission navigator for prior to admission medications     Medication history source reliability:Good    Actions taken by pharmacist (provider contacted, etc):None     Additional medication history information not noted on PTA med list :None    Medication reconciliation/reorder completed by provider prior to medication history? No    Time spent in this activity: 15 minutes    Prior to Admission medications    Medication Sig Last Dose Taking? Auth Provider   IBUPROFEN PO Take 800 mg by mouth every 8 hours as needed for moderate pain 10/31/2017 at Unknown time Yes Unknown, Entered By History   BuPROPion HCl (WELLBUTRIN XL PO) Take 150 mg by mouth every morning 10/31/2017 at AM Yes Unknown, Entered By History   HYDROcodone-acetaminophen (NORCO) 5-325 MG per tablet Take 1 tablet by mouth every 4 hours as needed for moderate to severe pain  at PRN Yes Iris Mars MD   acetaminophen (TYLENOL) 325 MG tablet Take 2 tablets (650 mg) by mouth every 4 hours as needed for mild pain or fever 10/31/2017 at Unknown time Yes Jose Massey MD   oxyCODONE (ROXICODONE) 5 MG IR tablet Take 1-3 tablets (5-15 mg) by mouth every 3 hours as needed for pain or other (Moderate to Severe) 10/31/2017 at x2 Yes Jose Massey MD   ALPRAZolam (XANAX PO) Take 0.25 mg by mouth 4 times daily as needed for anxiety Past Month at P{RN Yes Reported, Patient   ondansetron (ZOFRAN-ODT) 4 MG disintegrating tablet Take 1 tablet (4 mg) by mouth every 6 hours as needed for nausea Past Month at PRN Yes Khloe Arreguin PA-C   DULoxetine HCl (CYMBALTA PO) Take 120 mg by mouth daily  10/30/2017 at HS Yes Reported, Patient   Modafinil (PROVIGIL PO) Take 400 mg by mouth every morning 2 x 200 mg tabs 10/31/2017 at A> Yes Reported, Patient   hydrOXYzine (ATARAX) 25 MG tablet Take 1 tablet (25  mg) by mouth every 6 hours as needed for itching (and nausea, spasms, adjuvant pain)   Jose Massey MD David S. Cline, PharmD

## 2017-11-01 NOTE — PROGRESS NOTES
[facility]    Hospitalist Progress Note    Date of Service (when I saw the patient): 11/01/2017    Assessment & Plan   Kelsie Liang is a very pleasant 62-year-old female with a history of kidney stone, anxiety, depression, and obstructive sleep apnea for which she wears a CPAP, who had an accident with her dogs, and required surgery with four pins on 10/12/2017 at the [facility], and yesterday the patient started noticing increasing swelling and pain as well as some slight erythema on the dorsum of the MCP area.  The patient came to the ER with concern for infection postoperatively.     Postoperative changes concerning for soft tissue left hand infection: Patient surgery was on 10/12/2017 and she improved for the time after that but now presents with increasing pain and redness and swelling of the affected hand.  X-ray of the hand shows fractures are in anatomic alignment and hardware is intact.  No other definite fractures seen.       - Redness and swelling has extended beyond the marked border this morning.  New area of redness has also been marked.  - Started on Rocephin in the ED.  This was discontinued and changed 11/1 to IV clindamycin and Ancef for broader coverage.  - Infectious disease has been consulted, input appreciated  - MRSA PCR from the naris is pending.  - Orthopedics/hand surgery has been contacted    - We will hold on any further imaging and let Dr. Berrios decide if he wants any other imaging.     History of obstructive sleep apnea: Continue CPAP use with home settings     History of anxiety and depression:  Continue PTA psychiatry medications.    Chronic conditions:  History of benign hematuria, evaluated in the past.   History of a kidney stone in 09/2017.     DVT prophylaxis:  The patient will be ambulatory.     CODE STATUS:  FULL CODE.     Disposition:  Will change to inpatient status given extension of erythema and swelling, pain requiring IV medications,  and broadening of IV antibiotics.  Anticipate that she will need at least two more days in the hospital to ensure that she is improving.    Brad Ronan Giraldo    Interval History   Patient reports increasing pain only partially helped by Norco and IV Dilaudid.  No fevers.  The redness and swelling seems to have extended proximally since last night.  She denies any other symptoms such as chest pain, shortness of breath, abdominal pain, nausea, vomiting.  She is worried that she is not feeling better yet.    -Data reviewed today: I reviewed all new labs and imaging results over the last 24 hours.     Physical Exam   Temp: 97.2  F (36.2  C) Temp src: Oral BP: 103/62 Pulse: 91 Heart Rate: 83 Resp: 16 SpO2: 96 % O2 Device: None (Room air)    Vitals:    10/31/17 2128 10/31/17 2345   Weight: 68 kg (150 lb) 69.1 kg (152 lb 4.8 oz)     Vital Signs with Ranges  Temp:  [97.2  F (36.2  C)-98.2  F (36.8  C)] 97.2  F (36.2  C)  Pulse:  [] 91  Heart Rate:  [] 83  Resp:  [16] 16  BP: (103-161)/(62-98) 103/62  SpO2:  [96 %-98 %] 96 %       Constitutional: Alert, oriented to person, place, date, situation.  Cooperative, lying in bed in NAD.   Respiratory:  Lungs CTAB.  No crackles, wheezes, or rhonchi, no labored breathing.  Cardiovascular:  Heart RRR, no MRG, distal pulses are palpable.  GI:  Abdomen soft, NT/ND and with normoactive BS  Skin/Integumen:  Warm, dry, non-diaphoretic.  Erythema extending proximally from demarcated border on left forearm mainly on the dorsal aspect.    MSK: CMS x4 intact.  There is tenderness to palpation on the left wrist and fingers.  Her surgical bandage/splint is in place and was not removed.    Medications        DULoxetine (CYMBALTA) EC capsule 120 mg  120 mg Oral Daily     HYDROmorphone  0.5 mg Intravenous Once     ceFAZolin  2 g Intravenous Q8H     clindamycin  900 mg Intravenous Q8H       Data     Recent Labs  Lab 10/31/17  2240 10/31/17  2200   WBC 8.9 Canceled, Test credited    HGB 11.6* Canceled, Test credited   MCV 95 Canceled, Test credited    Canceled, Test credited   NA  --  141   POTASSIUM  --  3.9   CHLORIDE  --  109   CO2  --  25   BUN  --  16   CR  --  0.70   ANIONGAP  --  7   JOANNA  --  8.6   GLC  --  132*       Recent Results (from the past 24 hour(s))   XR Hand Left 3 Views    Narrative    LEFT HAND THREE VIEWS   10/31/2017 10:20 PM    HISTORY: Postoperative evaluation.    COMPARISON: None.      Impression    IMPRESSION: There are surgical changes of K wires spanning fractures  of the proximal phalanges of both the middle and ring fingers. The  fractures are in near anatomic alignment and the hardware is intact.  There appears to be an old healed fracture through the base of the  fourth metacarpal. No other definite fracture is seen. There are  moderate degenerative changes of the first carpometacarpal joint. No  other abnormality is noted.     ADITYA LI MD

## 2017-11-02 LAB
ANION GAP SERPL CALCULATED.3IONS-SCNC: 5 MMOL/L (ref 3–14)
BUN SERPL-MCNC: 11 MG/DL (ref 7–30)
CALCIUM SERPL-MCNC: 8.8 MG/DL (ref 8.5–10.1)
CHLORIDE SERPL-SCNC: 106 MMOL/L (ref 94–109)
CO2 SERPL-SCNC: 27 MMOL/L (ref 20–32)
CREAT SERPL-MCNC: 0.79 MG/DL (ref 0.52–1.04)
CRP SERPL-MCNC: 82.9 MG/L (ref 0–8)
ERYTHROCYTE [DISTWIDTH] IN BLOOD BY AUTOMATED COUNT: 12.6 % (ref 10–15)
GFR SERPL CREATININE-BSD FRML MDRD: 74 ML/MIN/1.7M2
GLUCOSE SERPL-MCNC: 101 MG/DL (ref 70–99)
HCT VFR BLD AUTO: 32.5 % (ref 35–47)
HGB BLD-MCNC: 10.8 G/DL (ref 11.7–15.7)
MCH RBC QN AUTO: 31.7 PG (ref 26.5–33)
MCHC RBC AUTO-ENTMCNC: 33.2 G/DL (ref 31.5–36.5)
MCV RBC AUTO: 95 FL (ref 78–100)
PLATELET # BLD AUTO: 243 10E9/L (ref 150–450)
POTASSIUM SERPL-SCNC: 4.2 MMOL/L (ref 3.4–5.3)
RBC # BLD AUTO: 3.41 10E12/L (ref 3.8–5.2)
SODIUM SERPL-SCNC: 138 MMOL/L (ref 133–144)
WBC # BLD AUTO: 6.2 10E9/L (ref 4–11)

## 2017-11-02 PROCEDURE — 25000128 H RX IP 250 OP 636: Performed by: INTERNAL MEDICINE

## 2017-11-02 PROCEDURE — 86140 C-REACTIVE PROTEIN: CPT | Performed by: PHYSICIAN ASSISTANT

## 2017-11-02 PROCEDURE — 25000125 ZZHC RX 250: Performed by: INTERNAL MEDICINE

## 2017-11-02 PROCEDURE — 99232 SBSQ HOSP IP/OBS MODERATE 35: CPT | Performed by: INTERNAL MEDICINE

## 2017-11-02 PROCEDURE — 25000132 ZZH RX MED GY IP 250 OP 250 PS 637: Performed by: INTERNAL MEDICINE

## 2017-11-02 PROCEDURE — 12000000 ZZH R&B MED SURG/OB

## 2017-11-02 PROCEDURE — 36415 COLL VENOUS BLD VENIPUNCTURE: CPT | Performed by: PHYSICIAN ASSISTANT

## 2017-11-02 PROCEDURE — 25000132 ZZH RX MED GY IP 250 OP 250 PS 637: Performed by: PHYSICIAN ASSISTANT

## 2017-11-02 PROCEDURE — S0077 INJECTION, CLINDAMYCIN PHOSP: HCPCS | Performed by: INTERNAL MEDICINE

## 2017-11-02 PROCEDURE — 25000128 H RX IP 250 OP 636: Performed by: PHYSICIAN ASSISTANT

## 2017-11-02 PROCEDURE — 80048 BASIC METABOLIC PNL TOTAL CA: CPT | Performed by: PHYSICIAN ASSISTANT

## 2017-11-02 PROCEDURE — 85027 COMPLETE CBC AUTOMATED: CPT | Performed by: PHYSICIAN ASSISTANT

## 2017-11-02 RX ORDER — POLYETHYLENE GLYCOL 3350 17 G/17G
17 POWDER, FOR SOLUTION ORAL DAILY
Status: DISCONTINUED | OUTPATIENT
Start: 2017-11-02 | End: 2017-11-03 | Stop reason: HOSPADM

## 2017-11-02 RX ORDER — SODIUM CHLORIDE 9 MG/ML
INJECTION, SOLUTION INTRAVENOUS CONTINUOUS
Status: DISCONTINUED | OUTPATIENT
Start: 2017-11-02 | End: 2017-11-03

## 2017-11-02 RX ADMIN — HYDROMORPHONE HYDROCHLORIDE 0.5 MG: 1 INJECTION, SOLUTION INTRAMUSCULAR; INTRAVENOUS; SUBCUTANEOUS at 06:36

## 2017-11-02 RX ADMIN — SODIUM CHLORIDE: 9 INJECTION, SOLUTION INTRAVENOUS at 21:21

## 2017-11-02 RX ADMIN — OXYCODONE HYDROCHLORIDE 10 MG: 5 TABLET ORAL at 07:55

## 2017-11-02 RX ADMIN — OXYCODONE HYDROCHLORIDE 5 MG: 5 TABLET ORAL at 02:38

## 2017-11-02 RX ADMIN — OXYCODONE HYDROCHLORIDE 10 MG: 5 TABLET ORAL at 16:57

## 2017-11-02 RX ADMIN — CEFAZOLIN SODIUM 2 G: 2 INJECTION, SOLUTION INTRAVENOUS at 08:01

## 2017-11-02 RX ADMIN — HYDROMORPHONE HYDROCHLORIDE 0.5 MG: 1 INJECTION, SOLUTION INTRAMUSCULAR; INTRAVENOUS; SUBCUTANEOUS at 15:50

## 2017-11-02 RX ADMIN — ACETAMINOPHEN 975 MG: 325 TABLET, FILM COATED ORAL at 15:09

## 2017-11-02 RX ADMIN — CEFAZOLIN SODIUM 2 G: 2 INJECTION, SOLUTION INTRAVENOUS at 00:36

## 2017-11-02 RX ADMIN — ACETAMINOPHEN 975 MG: 325 TABLET, FILM COATED ORAL at 21:21

## 2017-11-02 RX ADMIN — HYDROMORPHONE HYDROCHLORIDE 0.5 MG: 1 INJECTION, SOLUTION INTRAMUSCULAR; INTRAVENOUS; SUBCUTANEOUS at 09:32

## 2017-11-02 RX ADMIN — CEFAZOLIN SODIUM 2 G: 2 INJECTION, SOLUTION INTRAVENOUS at 16:58

## 2017-11-02 RX ADMIN — CLINDAMYCIN PHOSPHATE 900 MG: 18 INJECTION, SOLUTION INTRAVENOUS at 21:21

## 2017-11-02 RX ADMIN — OXYCODONE HYDROCHLORIDE 5 MG: 5 TABLET ORAL at 23:12

## 2017-11-02 RX ADMIN — HYDROMORPHONE HYDROCHLORIDE 0.5 MG: 1 INJECTION, SOLUTION INTRAMUSCULAR; INTRAVENOUS; SUBCUTANEOUS at 22:48

## 2017-11-02 RX ADMIN — DULOXETINE HYDROCHLORIDE 120 MG: 30 CAPSULE, DELAYED RELEASE ORAL at 08:02

## 2017-11-02 RX ADMIN — SODIUM CHLORIDE: 9 INJECTION, SOLUTION INTRAVENOUS at 08:38

## 2017-11-02 RX ADMIN — HYDROMORPHONE HYDROCHLORIDE 0.5 MG: 1 INJECTION, SOLUTION INTRAMUSCULAR; INTRAVENOUS; SUBCUTANEOUS at 00:33

## 2017-11-02 RX ADMIN — HYDROMORPHONE HYDROCHLORIDE 0.5 MG: 1 INJECTION, SOLUTION INTRAMUSCULAR; INTRAVENOUS; SUBCUTANEOUS at 11:37

## 2017-11-02 RX ADMIN — OXYCODONE HYDROCHLORIDE 5 MG: 5 TABLET ORAL at 20:02

## 2017-11-02 RX ADMIN — BUPROPION HYDROCHLORIDE 150 MG: 150 TABLET, FILM COATED, EXTENDED RELEASE ORAL at 08:03

## 2017-11-02 RX ADMIN — CLINDAMYCIN PHOSPHATE 900 MG: 18 INJECTION, SOLUTION INTRAVENOUS at 06:38

## 2017-11-02 RX ADMIN — ACETAMINOPHEN 975 MG: 325 TABLET, FILM COATED ORAL at 08:02

## 2017-11-02 RX ADMIN — CLINDAMYCIN PHOSPHATE 900 MG: 18 INJECTION, SOLUTION INTRAVENOUS at 14:51

## 2017-11-02 RX ADMIN — MODAFINIL 400 MG: 200 TABLET ORAL at 08:01

## 2017-11-02 RX ADMIN — POLYETHYLENE GLYCOL 3350 17 G: 17 POWDER, FOR SOLUTION ORAL at 14:51

## 2017-11-02 NOTE — PROGRESS NOTES
[facility]    Hospitalist Progress Note :     Cumulative Summary: Kelsie Liang is a 62 year old female who was admitted on 10/31/2017.  Her past medical history is significant for history of kidney stones, anxiety, depression and obstructive sleep apnea for which she wears CPAP, who had an accident with her dogs and required surgery with four pins on October 12, 2017 at [facility], day before the admission patient started to notice increased swelling and pain as well as a repeat on the dorsum of the metacarpophalangeal area, she came to the ER with a concern for postoperative infection.  Patient was admitted for further evaluation and management and was started on broad spectrum antibiotics, infectious disease was also consulted her pain medications are adjusted due to patient being in considerable pain.    Assessment & Plan     Active Problems:   Postoperative wound infection, initial encounter (11/1/2017)   Left hand infection: Patient surgery was on 10/12/2017 and she improved for the time after that but now presents with increasing pain and redness and swelling of the affected hand.  X-ray of the hand showed fractures are in anatomic alignment and hardware is intact.  No other definite fractures seen. Redness and swelling has extended beyond the marked border this morning.  New area of redness has also been marked.    -Patient was started on ceftriaxone in the emergency room but then was brought into IV clindamycin and Ancef from product coverage and better anaerobic coverage, infectious diseases swallowing appreciate their help.  -Orthopedics/hand surgery they have been contacted and following, recommending for patient to stay on IV antibiotics at this point,  has removed the pins this morning.  - Due to significant pain and discomfort, started her on Tylenol three times a day routine, also ordered oxycodone 5-10 mg every three hours as needed and Dilaudid IV  for severe pain.  -She was slightly hypotensive this morning and started her on some IV fluids, she does not have any lightheadedness or confusion, told me that her systolic blood pressure usually once around .  -We'll order some stool softeners as patient is on narcotic pain medications for pain control, discussed with her regarding using less narcotics as much as possible.     History of obstructive sleep apnea: Continue CPAP use with home settings      History of anxiety and depression:  Continue PTA psychiatry medications.     Chronic conditions:  History of benign hematuria, evaluated in the past.   History of a kidney stone in 09/2017.    -stable    DVT prophylaxis:  ambulate three times a day, also ordered compression stockings.    Code Status: Full Code    Disposition: Expected discharge tomorrow or Saturday if patient is starting to feel better and is okay from infectious disease and on 2.2.  To be discharged.    Angy Terrell MD, FACP  Text Page (7am - 6pm)      Interval History   Jose was seen and examined this morning, said also present at the bedside, her pain is better controlled since adjustment of medications.   erythema was slightly worsened and her pins were removed this morning, she is aware about being staying on IV antibiotics at this point.    -Data reviewed today: I reviewed all new labs and imaging results over the last 24 hours.  I personally reviewed no images or EKG's today.    Physical Exam   Temp: 98.2  F (36.8  C) Temp src: Oral BP: 107/66 Pulse: 92 Heart Rate: 102 Resp: 16 SpO2: 93 % O2 Device: None (Room air)    Vitals:    10/31/17 2128 10/31/17 2345 11/02/17 0636   Weight: 68 kg (150 lb) 69.1 kg (152 lb 4.8 oz) 70 kg (154 lb 5.2 oz)     Vital Signs with Ranges  Temp:  [97.7  F (36.5  C)-98.2  F (36.8  C)] 98.2  F (36.8  C)  Pulse:  [92] 92  Heart Rate:  [] 102  Resp:  [16-18] 16  BP: ()/(49-66) 107/66  SpO2:  [92 %-99 %] 93 %  I/O last 3 completed shifts:  In: 320  [P.O.:120; I.V.:200]  Out: -     GENERAL: Alert , awake and oriented. NAD. Conversational, appropriate.   HEENT: Normocephalic. EOMI. No icterus or injection. Nares normal.   LUNGS: Clear to auscultation. No dyspnea at rest.   HEART: Regular rate. Extremities perfused.   ABDOMEN: Soft, nontender, and nondistended. Positive bowel sounds.   EXTREMITIES: No LE edema noted. Warm, dry, Erythema extending proximally from demarcated border on left forearm mainly on the dorsal aspect.    NEUROLOGIC: Moves extremities x4 on command. No acute focal neurologic abnormalities noted.     Medications     NaCl 125 mL/hr at 11/02/17 0838       DULoxetine (CYMBALTA) EC capsule 120 mg  120 mg Oral Daily     buPROPion (WELLBUTRIN XL) 24 hr tablet 150 mg  150 mg Oral QAM     ceFAZolin  2 g Intravenous Q8H     clindamycin  900 mg Intravenous Q8H     acetaminophen  975 mg Oral TID     modafinil (PROVIGIL) tablet 400 mg  400 mg Oral QAM       Data     Recent Labs  Lab 11/02/17  0810 11/01/17  0720 10/31/17  2240 10/31/17  2200   WBC 6.2 7.0 8.9 Canceled, Test credited   HGB 10.8* 11.0* 11.6* Canceled, Test credited   MCV 95 95 95 Canceled, Test credited    274 278 Canceled, Test credited     --   --  141   POTASSIUM 4.2  --   --  3.9   CHLORIDE 106  --   --  109   CO2 27  --   --  25   BUN 11  --   --  16   CR 0.79  --   --  0.70   ANIONGAP 5  --   --  7   JOANNA 8.8  --   --  8.6   *  --   --  132*       Imaging:   No results found for this or any previous visit (from the past 24 hour(s)).

## 2017-11-02 NOTE — PLAN OF CARE
Problem: Patient Care Overview  Goal: Plan of Care/Patient Progress Review  Outcome: No Change  A&O x 4, up independent, vss, BP low  side, MD aware, on RA, given prn oxycodone  and dilaudid for left hand pain with relief, seen by ortho and ID, left hand red and swollen, brace on,applied ice and encouraged to elevate on pillows, good appetite, continuous on iv abx, possible d/c in 1-2 days.

## 2017-11-02 NOTE — PLAN OF CARE
Problem: Patient Care Overview  Goal: Individualization & Mutuality  Outcome: No Change  Care Plan Summary Note: vss, alert x4. Moderate to severe pain when moving left hand. Four wires visible on knuckles. Slight redness noted on left hand, border marked. Elevating left arm, ice pack applied. Pt had been requesting to be wakened up for PRN pain meds (oxycodone and dilaudid), oxycodone 5mg given x2, dilaudid IVP given x3 during shift. PIV patent and SL. IND in room. +BS, denied constipation (since on narcotic), lungs clear. Good appetite, adequate UO. Nare swap was neg for MRSA. Continue to monitor.

## 2017-11-02 NOTE — PLAN OF CARE
Problem: Patient Care Overview  Goal: Plan of Care/Patient Progress Review  Outcome: Improving  Pt A&Ox4. VSS on RA ex BP soft in 90s. C/o pain in L hand 4-6/10 with relief from oxycodone x1 and dilaudid x2. Redness and swelling in L hand, border remarked. Elevating hand and ice pack applied. Up ind. Regular diet. ID following. Continue to monitor.

## 2017-11-02 NOTE — PROGRESS NOTES
[facility]    Infectious Disease Progress Note    Date of Service (when I saw the patient): 11/02/2017     Assessment & Plan   Kelsie Liang is a 62 year old female who was admitted on 10/31/2017.     Impression:  1. 62 y.o admitted with pain and redness at the site of recent surgery.   2. Approximately 3.5 weeks ago she sustained spiral fractures of her third and fourth proximal phalanx, s/p surgical pinning by Dr. Gardner of [facility] on 10/12/17.  3. Was on ceftriaxone and later zosyn..  4. MRSA PCR is neg.      Recommendations:   Continue ancef and clindamycin.   Will follow.       Karlos Noonan MD    Interval History   Pins out   Swelling improved   Redness same     Physical Exam   Temp: 98.2  F (36.8  C) Temp src: Oral BP: (!) 89/49 Pulse: 92 Heart Rate: 92 Resp: 16 SpO2: 93 % O2 Device: None (Room air)    Vitals:    10/31/17 2128 10/31/17 2345 11/02/17 0636   Weight: 68 kg (150 lb) 69.1 kg (152 lb 4.8 oz) 70 kg (154 lb 5.2 oz)     Vital Signs with Ranges  Temp:  [97.7  F (36.5  C)-98.2  F (36.8  C)] 98.2  F (36.8  C)  Pulse:  [92] 92  Heart Rate:  [90-97] 92  Resp:  [16-18] 16  BP: ()/(49-65) 89/49  SpO2:  [92 %-99 %] 93 %    Constitutional: Awake, alert, cooperative, no apparent distress  Lungs: Clear to auscultation bilaterally, no crackles or wheezing  Cardiovascular: Regular rate and rhythm, normal S1 and S2, and no murmur noted  Abdomen: Normal bowel sounds, soft, non-distended, non-tender  Skin: No rashes, no cyanosis, no edema  Other:    Medications     NaCl 125 mL/hr at 11/02/17 0838       DULoxetine (CYMBALTA) EC capsule 120 mg  120 mg Oral Daily     buPROPion (WELLBUTRIN XL) 24 hr tablet 150 mg  150 mg Oral QAM     ceFAZolin  2 g Intravenous Q8H     clindamycin  900 mg Intravenous Q8H     acetaminophen  975 mg Oral TID     modafinil (PROVIGIL) tablet 400 mg  400 mg Oral QAM       Data   All microbiology laboratory data reviewed.  Recent Labs   Lab Test  11/02/17   0810   11/01/17   0720  10/31/17   2240   WBC  6.2  7.0  8.9   HGB  10.8*  11.0*  11.6*   HCT  32.5*  32.8*  34.2*   MCV  95  95  95   PLT  243  274  278     Recent Labs   Lab Test  11/02/17   0810  10/31/17   2200  09/03/17   2115   CR  0.79  0.70  0.75     Recent Labs   Lab Test  10/31/17   2240   SED  15     Recent Labs   Lab Test  09/03/17 2028 09/05/13   1520   CULT  50,000 to 100,000 colonies/mL  Escherichia coli  *  No growth

## 2017-11-02 NOTE — PROGRESS NOTES
ORTHOPEDIC UPPER EXTREMITY PROGRESS NOTE    Patient is a 62 year old female who underwent  Percutaneous pinning left middle and ring fingers proximal phalanx fx 10/12/17. Pt was doing well until 2 days ago when she had increase in pain, swelling and erythema. Pt was admitted for cellulitis. Pt was started on IV abx.     Pt reports swelling seems slightly worse today. Reports continued stiffness.     Vitals: BP (!) 89/49 (BP Location: Right arm)  Pulse 92  Temp 98.2  F (36.8  C) (Oral)  Resp 16  Ht 1.524 m (5')  Wt 70 kg (154 lb 5.2 oz)  SpO2 93%  BMI 30.14 kg/m2      EXAM   The patient is awake and alert.   Sensation is intact.  Digital Flexion/Extension maintained restricted secondary to swelling.   Brisk cap refill.   Diffuse swelling left hand pins in place. No drainage. Pain with flexion at MCPs    Labs:   Recent Labs   Lab Test  11/02/17   0810  11/01/17   0720  10/31/17   2240   HGB  10.8*  11.0*  11.6*       ASSESSMENT  Left hand cellulitis s/p percutaneous pinning - no sig improvement today      PLAN  1. Pins pulled by Dr. Gardner today   2. Cont elevation  3. Cont splint  4. Cont IV abx - would favor another day of IV abx with plans for dc to home tomorrow.     Pt seen and examined by Dr. Gardner. Plan per Dr. Gardner.     Irlanda Thakkar PA-C  [facility]  [phone]

## 2017-11-03 VITALS
HEART RATE: 102 BPM | RESPIRATION RATE: 18 BRPM | BODY MASS INDEX: 30.3 KG/M2 | DIASTOLIC BLOOD PRESSURE: 75 MMHG | SYSTOLIC BLOOD PRESSURE: 115 MMHG | WEIGHT: 154.32 LBS | OXYGEN SATURATION: 96 % | HEIGHT: 60 IN | TEMPERATURE: 98.2 F

## 2017-11-03 PROBLEM — T81.40XA POSTOPERATIVE INFECTION: Status: ACTIVE | Noted: 2017-11-03

## 2017-11-03 LAB
CRP SERPL-MCNC: 71.2 MG/L (ref 0–8)
PROCALCITONIN SERPL-MCNC: <0.05 NG/ML
WBC # BLD AUTO: 5.2 10E9/L (ref 4–11)

## 2017-11-03 PROCEDURE — 86140 C-REACTIVE PROTEIN: CPT | Performed by: INTERNAL MEDICINE

## 2017-11-03 PROCEDURE — 25000132 ZZH RX MED GY IP 250 OP 250 PS 637: Performed by: INTERNAL MEDICINE

## 2017-11-03 PROCEDURE — 36415 COLL VENOUS BLD VENIPUNCTURE: CPT | Performed by: INTERNAL MEDICINE

## 2017-11-03 PROCEDURE — 25000125 ZZHC RX 250: Performed by: INTERNAL MEDICINE

## 2017-11-03 PROCEDURE — 84145 PROCALCITONIN (PCT): CPT | Performed by: INTERNAL MEDICINE

## 2017-11-03 PROCEDURE — 99238 HOSP IP/OBS DSCHRG MGMT 30/<: CPT | Performed by: INTERNAL MEDICINE

## 2017-11-03 PROCEDURE — 25000128 H RX IP 250 OP 636: Performed by: INTERNAL MEDICINE

## 2017-11-03 PROCEDURE — 85048 AUTOMATED LEUKOCYTE COUNT: CPT | Performed by: INTERNAL MEDICINE

## 2017-11-03 PROCEDURE — S0077 INJECTION, CLINDAMYCIN PHOSP: HCPCS | Performed by: INTERNAL MEDICINE

## 2017-11-03 PROCEDURE — 25000132 ZZH RX MED GY IP 250 OP 250 PS 637: Performed by: PHYSICIAN ASSISTANT

## 2017-11-03 RX ORDER — CLINDAMYCIN HCL 300 MG
300 CAPSULE ORAL 3 TIMES DAILY
Qty: 30 CAPSULE | Refills: 0 | Status: SHIPPED | OUTPATIENT
Start: 2017-11-03 | End: 2017-11-13

## 2017-11-03 RX ORDER — CEPHALEXIN 500 MG/1
500 CAPSULE ORAL 4 TIMES DAILY
Qty: 40 CAPSULE | Refills: 0 | Status: SHIPPED | OUTPATIENT
Start: 2017-11-03 | End: 2017-11-13

## 2017-11-03 RX ADMIN — OXYCODONE HYDROCHLORIDE 5 MG: 5 TABLET ORAL at 06:52

## 2017-11-03 RX ADMIN — MODAFINIL 400 MG: 200 TABLET ORAL at 08:45

## 2017-11-03 RX ADMIN — CEFAZOLIN SODIUM 2 G: 2 INJECTION, SOLUTION INTRAVENOUS at 08:56

## 2017-11-03 RX ADMIN — BUPROPION HYDROCHLORIDE 150 MG: 150 TABLET, FILM COATED, EXTENDED RELEASE ORAL at 08:45

## 2017-11-03 RX ADMIN — CLINDAMYCIN PHOSPHATE 900 MG: 18 INJECTION, SOLUTION INTRAVENOUS at 06:37

## 2017-11-03 RX ADMIN — CEFAZOLIN SODIUM 2 G: 2 INJECTION, SOLUTION INTRAVENOUS at 15:58

## 2017-11-03 RX ADMIN — POLYETHYLENE GLYCOL 3350 17 G: 17 POWDER, FOR SOLUTION ORAL at 08:46

## 2017-11-03 RX ADMIN — ACETAMINOPHEN 975 MG: 325 TABLET, FILM COATED ORAL at 08:45

## 2017-11-03 RX ADMIN — CLINDAMYCIN PHOSPHATE 900 MG: 18 INJECTION, SOLUTION INTRAVENOUS at 15:03

## 2017-11-03 RX ADMIN — ACETAMINOPHEN 975 MG: 325 TABLET, FILM COATED ORAL at 15:59

## 2017-11-03 RX ADMIN — CEFAZOLIN SODIUM 2 G: 2 INJECTION, SOLUTION INTRAVENOUS at 01:09

## 2017-11-03 RX ADMIN — DULOXETINE HYDROCHLORIDE 120 MG: 30 CAPSULE, DELAYED RELEASE ORAL at 08:45

## 2017-11-03 RX ADMIN — OXYCODONE HYDROCHLORIDE 5 MG: 5 TABLET ORAL at 02:38

## 2017-11-03 NOTE — PLAN OF CARE
Problem: Patient Care Overview  Goal: Plan of Care/Patient Progress Review  Outcome: Improving  Pt A&OX4, VSS on RA. LS clear. Pain well managed with scheduled tylenol. LUE improved from yesterday. Swelling has gone done. Up independent. On regular diet. Voiding adequately. 1 BM this shift. On IV abx. Discharge possible tonight or tomorrow. Continue to monitor.

## 2017-11-03 NOTE — PLAN OF CARE
Problem: Patient Care Overview  Goal: Plan of Care/Patient Progress Review  Outcome: No Change  PT A&Ox4. VSS on RA ex BP soft. C/o L hand pain, received oxycodone x1. L hand swelling with some redness. CRP 82.9. UP independently. Regular diet. IVF infusing. IV abx. Continue to monitor.

## 2017-11-03 NOTE — PLAN OF CARE
Problem: Patient Care Overview  Goal: Plan of Care/Patient Progress Review  Outcome: Adequate for Discharge Date Met:  11/03/17  Pt A/O x4. Vitals stable. IV antibiotics finished, IV removed and to start oral antibiotics upon DC. LUE elevated. Pt denies pain. L hand edematous and limited movement. Regular diet and up independent. Adequate for DC to home with .

## 2017-11-03 NOTE — PROVIDER NOTIFICATION
MD Notification    Notified Person:  MD    Notified Persons Name:    Notification Date/Time: 11-3-17 at 1720    Notification Interaction:  Talked with Physician    Purpose of Notification:Clarify DC orders    Orders Received: OK to Dc per ortho with follow up appt Monday at 10:15 at [facility] office.    Comments:

## 2017-11-03 NOTE — DISCHARGE SUMMARY
[facility]    Discharge Summary  Hospitalist    Date of Admission:  10/31/2017  Date of Discharge:  11/3/2017  Discharging Provider: Angy Terrell MD,FACP    Discharge Diagnoses     Active Problems:    Postoperative left Hand wound infection, initial encounter    History of ARSENIO    History of Anxiety and depression       History of Present Illness   Kelsie Liang is an 62 year old female who presented with left hand postoperative hand infection and was admitted for further evaluation and management on 10/31/17.please review the H&P for further details regarding the admission.    Hospital Course   Kelsie Liang is a very pleasant 62-year-old female with a history of anxiety, obstructive sleep apnea, and kidney stones.  She had a surgery in [place], [place], at the [facility] on 10/12/2017, when she had proximal phalanx fractures of the third and fourth fingers of the left hand, requiring four pins.She said that one of these was a spiral fracture, and the other was a comminuted fracture. 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       The patient was doing well.  However, a day before admission she noticed that she had increased swelling and pain, and also some redness which was new, and because of this, the patient presented to the emergency room.She was noted to be slightly tachycardic, but otherwise no fever. They contacted Dr. Berrios from Orthopedics,she was started initially on Rocephin and was requested to be admitted for further evaluation and management.    Patient was started on clindamycin 900 mg IV every eight hours along with Ancef 2 g every eight hours.  Patient was also evaluated by surgery who monitored the patient during the hospitalization and due to improvement  recommended to continue patient on oral antibiotics without any need for further surgeries.  Initially patient did require increase in her pain medications including IV Dilaudid but on the day of discharge she was much comfortable and did not require any narcotic medications for the hand pain.    She will be discharged on oral antibiotics p.o. Keflex and clindamycin for the next 10 days and she isn't I is to take over-the-counter probiotics and consume you get when she is on the antibiotics to prevent antibiotic associated diarrhea.    Patient was seen and examined on the day of discharge , she is feeling well, does not have any complaints , I did review the discharge medications and instructions with the patient and plan for her to follow up with the PCP after the hospitalization .patient was in agreement ,she is discharged in stable condition back to her home.    Angy Terrell MD, FACP    Significant Results and Procedures   As below    Pending Results   NONE    Unresulted Labs Ordered in the Past 30 Days of this Admission     No orders found from 9/1/2017 to 11/1/2017.          Code Status   Full Code       Primary Care Physician   Iona Gabriel    Physical Exam   Temp: 97.9  F (36.6  C) Temp src: Oral BP: 111/71 Pulse: 102 Heart Rate: 98 Resp: 18 SpO2: 93 % O2 Device: None (Room air)    Vitals:    10/31/17 2128 10/31/17 2345 11/02/17 0636   Weight: 68 kg (150 lb) 69.1 kg (152 lb 4.8 oz) 70 kg (154 lb 5.2 oz)     Vital Signs with Ranges  Temp:  [97.5  F (36.4  C)-98.2  F (36.8  C)] 97.9  F (36.6  C)  Pulse:  [102] 102  Heart Rate:  [90-98] 98  Resp:  [16-18] 18  BP: ()/(64-73) 111/71  SpO2:  [93 %-95 %] 93 %  I/O last 3 completed shifts:  In: 3248.5 [P.O.:720; I.V.:2528.5]  Out: -     Constitutional: Awake, alert, cooperative, no apparent distress  Respiratory: Clear to auscultation bilaterally, no crackles or wheezing  Cardiovascular: Regular rate and rhythm, normal S1 and S2, and no murmur noted  GI:  Normal bowel sounds, soft, non-distended, non-tender  Skin/Integumen: No rashes, no cyanosis, no edema, Warm, dry, Erythema improving from demarcated border on left forearm mainly on the dorsal aspect    Discharge Disposition   Discharged to home  Condition at discharge: Stable    Consultations This Hospital Stay   ORTHOPEDIC SURGERY IP CONSULT  INFECTIOUS DISEASES IP CONSULT  SPIRITUAL HEALTH SERVICES IP CONSULT    Time Spent on this Encounter   IAngy, personally saw the patient today and spent less than or equal to 30 minutes discharging this patient.    Discharge Orders   No discharge procedures on file.  Discharge Medications   Current Discharge Medication List      START taking these medications    Details   clindamycin (CLEOCIN) 300 MG capsule Take 1 capsule (300 mg) by mouth 3 times daily for 10 days  Qty: 30 capsule, Refills: 0    Associated Diagnoses: Cellulitis of left hand      cephALEXin (KEFLEX) 500 MG capsule Take 1 capsule (500 mg) by mouth 4 times daily for 10 days  Qty: 40 capsule, Refills: 0    Associated Diagnoses: Cellulitis of left hand      Lactobacillus (ACIDOPHILUS) tablet Take 1 tablet by mouth 3 times daily for 15 days  Qty: 45 tablet, Refills: 0    Associated Diagnoses: Cellulitis of left hand         CONTINUE these medications which have NOT CHANGED    Details   IBUPROFEN PO Take 800 mg by mouth every 8 hours as needed for moderate pain      BuPROPion HCl (WELLBUTRIN XL PO) Take 150 mg by mouth every morning      acetaminophen (TYLENOL) 325 MG tablet Take 2 tablets (650 mg) by mouth every 4 hours as needed for mild pain or fever  Qty: 100 tablet, Refills: 0    Associated Diagnoses: Hallux valgus (acquired), right foot      oxyCODONE (ROXICODONE) 5 MG IR tablet Take 1-3 tablets (5-15 mg) by mouth every 3 hours as needed for pain or other (Moderate to Severe)  Qty: 75 tablet, Refills: 0    Associated Diagnoses: Hallux valgus (acquired), right foot      ALPRAZolam (XANAX PO) Take  0.25 mg by mouth 4 times daily as needed for anxiety      ondansetron (ZOFRAN-ODT) 4 MG disintegrating tablet Take 1 tablet (4 mg) by mouth every 6 hours as needed for nausea  Qty: 15 tablet, Refills: 0    Associated Diagnoses: Viral gastroenteritis      DULoxetine HCl (CYMBALTA PO) Take 120 mg by mouth daily       Modafinil (PROVIGIL PO) Take 400 mg by mouth every morning 2 x 200 mg tabs      hydrOXYzine (ATARAX) 25 MG tablet Take 1 tablet (25 mg) by mouth every 6 hours as needed for itching (and nausea, spasms, adjuvant pain)  Qty: 40 tablet, Refills: 0    Associated Diagnoses: Hallux valgus (acquired), right foot         STOP taking these medications       HYDROcodone-acetaminophen (NORCO) 5-325 MG per tablet Comments:   Reason for Stopping:             Allergies   Allergies   Allergen Reactions     Mobic [Meloxicam]      Analgesics-antiinflammatory=night swets     Data   Most Recent 3 CBC's:  Recent Labs   Lab Test  11/03/17   0800  11/02/17   0810  11/01/17   0720  10/31/17   2240   WBC  5.2  6.2  7.0  8.9   HGB   --   10.8*  11.0*  11.6*   MCV   --   95  95  95   PLT   --   243  274  278      Most Recent 3 BMP's:  Recent Labs   Lab Test  11/02/17   0810  10/31/17   2200  09/03/17   2115   NA  138  141  142   POTASSIUM  4.2  3.9  3.6   CHLORIDE  106  109  107   CO2  27  25  28   BUN  11  16  13   CR  0.79  0.70  0.75   ANIONGAP  5  7  7   JOANNA  8.8  8.6  9.1   GLC  101*  132*  114*     Most Recent 2 LFT's:  Recent Labs   Lab Test  12/07/15   2127  11/16/13   0747   AST  18  14   ALT  31  35   ALKPHOS  164*  73   BILITOTAL  0.3  0.3     Most Recent INR's and Anticoagulation Dosing History:  Anticoagulation Dose History     There is no flowsheet data to display.        Most Recent 3 Troponin's:  Recent Labs   Lab Test  11/15/13   1319   TROPI  <0.012     Most Recent Cholesterol Panel:No lab results found.  Most Recent 6 Bacteria Isolates From Any Culture (See EPIC Reports for Culture Details):  Recent Labs   Lab  Test  09/03/17 2028 09/05/13   1520   CULT  50,000 to 100,000 colonies/mL  Escherichia coli  *  No growth     Most Recent TSH, T4 and A1c Labs:No lab results found.  Results for orders placed or performed during the hospital encounter of 10/31/17   XR Hand Left 3 Views    Narrative    LEFT HAND THREE VIEWS   10/31/2017 10:20 PM    HISTORY: Postoperative evaluation.    COMPARISON: None.      Impression    IMPRESSION: There are surgical changes of K wires spanning fractures  of the proximal phalanges of both the middle and ring fingers. The  fractures are in near anatomic alignment and the hardware is intact.  There appears to be an old healed fracture through the base of the  fourth metacarpal. No other definite fracture is seen. There are  moderate degenerative changes of the first carpometacarpal joint. No  other abnormality is noted.     ADITYA LI MD

## 2017-11-03 NOTE — PROGRESS NOTES
[facility]    Infectious Disease Progress Note    Date of Service (when I saw the patient): 11/03/2017     Assessment & Plan   Kelsie Liang is a 62 year old female who was admitted on 10/31/2017.     Impression:  1. 62 y.o admitted with pain and redness at the site of recent surgery. No deep infection suspected.   2. Approximately 3.5 weeks ago she sustained spiral fractures of her third and fourth proximal phalanx, s/p surgical pinning by Dr. Gardner of [facility] on 10/12/17.  3. Was on ceftriaxone and later zosyn..  4. MRSA PCR is neg.        Recommendations:   Continue ancef and clindamycin. If no deep infection is being suspected can be switched to oral Keflex 500 mg QID and clindamycin 300 mg TID at discharge for 10 more days.         Karlos Noonan MD    Interval History   Pins out   Swelling improved   Redness same     Physical Exam   Temp: 97.9  F (36.6  C) Temp src: Oral BP: 111/71 Pulse: 102 Heart Rate: 98 Resp: 18 SpO2: 93 % O2 Device: None (Room air)    Vitals:    10/31/17 2128 10/31/17 2345 11/02/17 0636   Weight: 68 kg (150 lb) 69.1 kg (152 lb 4.8 oz) 70 kg (154 lb 5.2 oz)     Vital Signs with Ranges  Temp:  [97.5  F (36.4  C)-98.2  F (36.8  C)] 97.9  F (36.6  C)  Pulse:  [102] 102  Heart Rate:  [90-98] 98  Resp:  [16-18] 18  BP: ()/(64-73) 111/71  SpO2:  [93 %-95 %] 93 %    Constitutional: Awake, alert, cooperative, no apparent distress  Lungs: Clear to auscultation bilaterally, no crackles or wheezing  Cardiovascular: Regular rate and rhythm, normal S1 and S2, and no murmur noted  Abdomen: Normal bowel sounds, soft, non-distended, non-tender  Skin: No rashes, no cyanosis, no edema  Other:    Medications        polyethylene glycol  17 g Oral Daily     DULoxetine (CYMBALTA) EC capsule 120 mg  120 mg Oral Daily     buPROPion (WELLBUTRIN XL) 24 hr tablet 150 mg  150 mg Oral QAM     ceFAZolin  2 g Intravenous Q8H     clindamycin  900 mg Intravenous Q8H     acetaminophen  975 mg  Oral TID     modafinil (PROVIGIL) tablet 400 mg  400 mg Oral QAM       Data   All microbiology laboratory data reviewed.  Recent Labs   Lab Test  11/03/17   0800  11/02/17   0810  11/01/17   0720  10/31/17   2240   WBC  5.2  6.2  7.0  8.9   HGB   --   10.8*  11.0*  11.6*   HCT   --   32.5*  32.8*  34.2*   MCV   --   95  95  95   PLT   --   243  274  278     Recent Labs   Lab Test  11/02/17   0810  10/31/17   2200 09/03/17   2115   CR  0.79  0.70  0.75     Recent Labs   Lab Test  10/31/17   2240   SED  15     Recent Labs   Lab Test  09/03/17 2028 09/05/13   1520   CULT  50,000 to 100,000 colonies/mL  Escherichia coli  *  No growth

## 2017-11-03 NOTE — DISCHARGE INSTRUCTIONS
Please continue to take Keflex and clindamycin for next ten days .  Please take probiotics ( available over the counter ) script is also given for next couple of weeks two to three times a day to avoid antibiotic associated diarrhea.  Please try to consume yoghurt every day while you are on antibiotics.  Please follow up with your Primary care physician and surgery as an out patient.    Appointment at 1015 at [facility] office 11/6/17 with Irlanda/.

## 2017-11-03 NOTE — PLAN OF CARE
Problem: Patient Care Overview  Goal: Individualization & Mutuality  Outcome: No Change  Care Plan Summary Note: vss, alert x4, moderate pain to left hand. PRN oxycodone given x3, dilaudid given x2, during shift. Ice pack applied, keep left arm elevated also helped with pain. Swelling and redness on left hand remained the same. IND in room. PIV infiltrated, restarted in left forearm. PIV infusing. +BS, lungs clear. 1 sm BM during shift. Continue to monitor. No new concerns.

## 2017-11-03 NOTE — PROGRESS NOTES
ORTHOPEDIC UPPER EXTREMITY PROGRESS NOTE    Patient is a 62 year old female who underwent  Percutaneous pinning left middle and ring fingers proximal phalanx fx 10/12/17. Pt was doing well until 2 days ago when she had increase in pain, swelling and erythema. Pt was admitted for cellulitis. Pt was started on IV abx. Pt on ancef and clinda    Pt reports her hand feels better today. Swelling improved. Continued swelling at middle finger.     Vitals: /71 (BP Location: Right arm)  Pulse 102  Temp 97.9  F (36.6  C) (Oral)  Resp 18  Ht 1.524 m (5')  Wt 70 kg (154 lb 5.2 oz)  SpO2 93%  BMI 30.14 kg/m2      EXAM   The patient is awake and alert.   Sensation is intact.  Digital Flexion/Extension maintained restricted secondary to swelling.   Brisk cap refill.   Swelling sig improved at dorsum of hand. Continued to swelling tenderness at middle proximal phalanx. Pins sites are covered.     Labs:   Recent Labs   Lab Test  11/02/17   0810  11/01/17   0720  10/31/17   2240   HGB  10.8*  11.0*  11.6*       ASSESSMENT  Left hand cellulitis s/p percutaneous pinning - improvement in swelling today.      PLAN  1. No surgical intervention indicated at this time.    2. Cont elevation  3. Cont splint  4. Cont abx per ID      Irlanda Thakkar PA-C  [facility]  [phone]

## 2017-12-13 ENCOUNTER — CARE COORDINATION (OUTPATIENT)
Dept: CARE COORDINATION | Facility: CLINIC | Age: 62
End: 2017-12-13

## 2017-12-13 NOTE — PROGRESS NOTES
Clinic Care Coordination Contact  [facility]/Voicemail    Referral Source: ED Follow-Up  Clinical Data: Care Coordinator Outreach  Outreach attempted x 1.  Left message on voicemail with call back information and requested return call.  Plan:  No further outreach by this CC.    Shivani Acosta RN  [facility]  Care Coordination  [phone]

## 2018-05-07 ENCOUNTER — APPOINTMENT (OUTPATIENT)
Dept: GENERAL RADIOLOGY | Facility: CLINIC | Age: 63
End: 2018-05-07
Attending: NURSE PRACTITIONER
Payer: COMMERCIAL

## 2018-05-07 ENCOUNTER — HOSPITAL ENCOUNTER (EMERGENCY)
Facility: CLINIC | Age: 63
Discharge: HOME | End: 2018-05-07
Attending: NURSE PRACTITIONER | Admitting: NURSE PRACTITIONER
Payer: COMMERCIAL

## 2018-05-07 VITALS
RESPIRATION RATE: 20 BRPM | DIASTOLIC BLOOD PRESSURE: 76 MMHG | HEIGHT: 60 IN | WEIGHT: 155 LBS | HEART RATE: 107 BPM | TEMPERATURE: 99.7 F | SYSTOLIC BLOOD PRESSURE: 119 MMHG | OXYGEN SATURATION: 95 % | BODY MASS INDEX: 30.43 KG/M2

## 2018-05-07 DIAGNOSIS — J06.9 UPPER RESPIRATORY INFECTION WITH COUGH AND CONGESTION: ICD-10-CM

## 2018-05-07 LAB — D DIMER PPP FEU-MCNC: 0.4 UG/ML FEU (ref 0–0.5)

## 2018-05-07 PROCEDURE — 25000125 ZZHC RX 250: Performed by: NURSE PRACTITIONER

## 2018-05-07 PROCEDURE — 85379 FIBRIN DEGRADATION QUANT: CPT | Performed by: NURSE PRACTITIONER

## 2018-05-07 PROCEDURE — 99283 EMERGENCY DEPT VISIT LOW MDM: CPT | Mod: 25

## 2018-05-07 PROCEDURE — 71046 X-RAY EXAM CHEST 2 VIEWS: CPT

## 2018-05-07 PROCEDURE — 94640 AIRWAY INHALATION TREATMENT: CPT

## 2018-05-07 RX ORDER — ALBUTEROL SULFATE 5 MG/ML
2.5 SOLUTION RESPIRATORY (INHALATION) ONCE
Status: COMPLETED | OUTPATIENT
Start: 2018-05-07 | End: 2018-05-07

## 2018-05-07 RX ORDER — ALBUTEROL SULFATE 0.83 MG/ML
1 SOLUTION RESPIRATORY (INHALATION) EVERY 6 HOURS PRN
Qty: 75 ML | Refills: 0 | Status: SHIPPED | OUTPATIENT
Start: 2018-05-07 | End: 2020-01-16

## 2018-05-07 RX ORDER — CODEINE PHOSPHATE AND GUAIFENESIN 10; 100 MG/5ML; MG/5ML
1-2 SOLUTION ORAL
Qty: 100 ML | Refills: 0 | Status: SHIPPED | OUTPATIENT
Start: 2018-05-07 | End: 2020-01-16

## 2018-05-07 RX ADMIN — ALBUTEROL SULFATE 2.5 MG: 2.5 SOLUTION RESPIRATORY (INHALATION) at 20:22

## 2018-05-07 ASSESSMENT — ENCOUNTER SYMPTOMS
SORE THROAT: 1
RHINORRHEA: 1
COUGH: 1
SHORTNESS OF BREATH: 1

## 2018-05-07 NOTE — ED AVS SNAPSHOT
Emergency Department    [address] [facility] [address]    Phone:  [phone]    Fax:  [phone]                                       Kelsie Liang   MRN: [id number]    Department:   Emergency Department   Date of Visit:  5/7/2018           After Visit Summary Signature Page     I have received my discharge instructions, and my questions have been answered. I have discussed any challenges I see with this plan with the nurse or doctor.    ..........................................................................................................................................  Patient/Patient Representative Signature      ..........................................................................................................................................  Patient Representative Print Name and Relationship to Patient    ..................................................               ................................................  Date                                            Time    ..........................................................................................................................................  Reviewed by Signature/Title    ...................................................              ..............................................  Date                                                            Time

## 2018-05-07 NOTE — ED AVS SNAPSHOT
Emergency Department    [address] [facility] [address]    Phone:  [phone]    Fax:  [phone]                                       Kelsie Liang   MRN: [id number]    Department:   Emergency Department   Date of Visit:  5/7/2018           Patient Information     Date Of Birth          1955        Your diagnoses for this visit were:     Upper respiratory infection with cough and congestion        You were seen by Abbie Panchal, CNP.      Follow-up Information     Follow up with Iona Gabriel MD In 3 days.    Specialty:  Family Practice    Contact information:    [facility]  [address]  [place] [id number] [phone]          Follow up with  Emergency Department.    Specialty:  EMERGENCY MEDICINE    Why:  As needed, If symptoms worsen    Contact information:    [address] [facility] [id number] [phone]        Discharge Instructions       Discharge Instructions  Upper Respiratory Infection    The upper respiratory tract includes the sinuses, nasal passages, pharynx, and larynx. A URI, or upper respiratory infection, is an infection of any of the parts of the upper airway. Symptoms include runny nose, congestion, sneezing, sore throat, cough, and fever. URIs are almost always caused by a virus. Antibiotics do not help with viral infections, so are generally not prescribed. A URI is very contagious through coughing and nasal secretions; make sure you wash your hands often and clean surfaces after sneezing, coughing or touching them. While you should start to improve in 3 - 5 days, remember that sometimes a cough can linger for several weeks.    Generally, every Emergency Department visit should have a follow-up clinic visit with either a primary or a specialty clinic/provider. Please follow-up as instructed by your emergency provider today.    Return to the Emergency Department if:    Any of your symptoms get much  worse.    You seem very sick, like being too weak to get up.    You have chest pain or shortness of breath.     You have a severe headache.    You are vomiting (throwing up) so much you cannot keep fluids or medicines down.    You have confusion or seem unusually drowsy.    You have a seizure.    What can I do to help myself?    Fill any prescriptions the provider gave you and take them right away    If you have a fever, get plenty of rest and drink lots of fluids, especially water.    Using a humidifier or saline nose spray will also help loosen mucous.     Clothes or blankets will not change your fever. Do what is comfortable for you.    Bathing or sponging in lukewarm water may help you feel better.    Acetaminophen (Tylenol ) or ibuprofen (Advil , Motrin ) will help bring fever down and may help you feel more comfortable. Be sure to read and follow the package directions, and ask your provider if you have questions.    Do not drink alcohol.    Decongestants may help you feel better. You may use decongestant nose sprays Afrin  (oxymetazoline) or Christopher-Synephrine  (phenylephrine hydrochloride) for up to 3 days, or may use a decongestant tablet like Sudafed  (pseudoephedrine).  If you were given a prescription for medicine here today, be sure to read all of the information (including the package insert) that comes with your prescription.  This will include important information about the medicine, its side effects, and any warnings that you need to know about.  The pharmacist who fills the prescription can provide more information and answer questions you may have about the medicine.  If you have questions or concerns that the pharmacist cannot address, please call or return to the Emergency Department.   Remember that you can always come back to the Emergency Department if you are not able to see your regular provider in the amount of time listed above, if you get any new symptoms, or if there is anything that  worries you.      24 Hour Appointment Hotline       To make an appointment at any [facility], call [phone] ([phone]). If you don't have a family doctor or clinic, we will help you find one. [place] clinics are conveniently located to serve the needs of you and your family.             Review of your medicines      START taking        Dose / Directions Last dose taken    albuterol (2.5 MG/3ML) 0.083% neb solution   Dose:  1 vial   Quantity:  75 mL        Take 1 vial (2.5 mg) by nebulization every 6 hours as needed for shortness of breath / dyspnea or wheezing   Refills:  0        guaiFENesin-codeine 100-10 MG/5ML Soln solution   Commonly known as:  ROBITUSSIN AC   Dose:  1-2 tsp.   Quantity:  100 mL        Take 5-10 mLs by mouth nightly as needed for cough (Do not take with xanax, oxycodone, or hydroxyzine)   Refills:  0          Our records show that you are taking the medicines listed below. If these are incorrect, please call your family doctor or clinic.        Dose / Directions Last dose taken    acetaminophen 325 MG tablet   Commonly known as:  TYLENOL   Dose:  650 mg   Quantity:  100 tablet        Take 2 tablets (650 mg) by mouth every 4 hours as needed for mild pain or fever   Refills:  0        CYMBALTA PO   Dose:  120 mg   Indication:  Major Depressive Disorder, Musculoskeletal Pain        Take 120 mg by mouth daily   Refills:  0        hydrOXYzine 25 MG tablet   Commonly known as:  ATARAX   Dose:  25 mg   Quantity:  40 tablet        Take 1 tablet (25 mg) by mouth every 6 hours as needed for itching (and nausea, spasms, adjuvant pain)   Refills:  0        IBUPROFEN PO   Dose:  800 mg        Take 800 mg by mouth every 8 hours as needed for moderate pain   Refills:  0        ondansetron 4 MG ODT tab   Commonly known as:  ZOFRAN-ODT   Dose:  4 mg   Quantity:  15 tablet        Take 1 tablet (4 mg) by mouth every 6 hours as needed for nausea   Refills:  0        oxyCODONE IR 5 MG tablet    Commonly known as:  ROXICODONE   Dose:  5-15 mg   Quantity:  75 tablet        Take 1-3 tablets (5-15 mg) by mouth every 3 hours as needed for pain or other (Moderate to Severe)   Refills:  0        PROVIGIL PO   Dose:  400 mg        Take 400 mg by mouth every morning 2 x 200 mg tabs   Refills:  0        WELLBUTRIN XL PO   Dose:  150 mg        Take 150 mg by mouth every morning   Refills:  0        XANAX PO   Dose:  0.25 mg        Take 0.25 mg by mouth 4 times daily as needed for anxiety   Refills:  0                Prescriptions were sent or printed at these locations (2 Prescriptions)                   Other Prescriptions                Printed at Department/Unit printer (2 of 2)         albuterol (2.5 MG/3ML) 0.083% neb solution               guaiFENesin-codeine (ROBITUSSIN AC) 100-10 MG/5ML SOLN solution                Procedures and tests performed during your visit     Chest XR,  PA & LAT    D dimer quantitative      Orders Needing Specimen Collection     None      Pending Results     Date and Time Order Name Status Description    5/7/2018 2017 Chest XR,  PA & LAT Preliminary             Pending Culture Results     No orders found from 5/5/2018 to 5/8/2018.            Pending Results Instructions     If you had any lab results that were not finalized at the time of your Discharge, you can call the ED Lab Result RN at [phone]. You will be contacted by this team for any positive Lab results or changes in treatment. The nurses are available 7 days a week from 10A to 6:30P.  You can leave a message 24 hours per day and they will return your call.        Test Results From Your Hospital Stay        5/7/2018  8:56 PM      Component Results     Component Value Ref Range & Units Status    D Dimer 0.4 0.0 - 0.50 ug/ml FEU Final    This D-dimer assay is intended for use in conjunction with a clinical pretest   probability assessment model to exclude pulmonary embolism (PE) and deep   venous thrombosis (DVT) in  outpatients suspected of PE or DVT. The cut-off   value is 0.5 ug/mL FEU.           5/7/2018  8:55 PM      Narrative     CHEST TWO VIEWS    5/7/2018 8:52 PM     HISTORY: Cough and wheeze.     COMPARISON: None.        Impression     IMPRESSION: No acute cardiopulmonary disease.                 Clinical Quality Measure: Blood Pressure Screening     Your blood pressure was checked while you were in the emergency department today. The last reading we obtained was  BP: 119/76 . Please read the guidelines below about what these numbers mean and what you should do about them.  If your systolic blood pressure (the top number) is less than 120 and your diastolic blood pressure (the bottom number) is less than 80, then your blood pressure is normal. There is nothing more that you need to do about it.  If your systolic blood pressure (the top number) is 120-139 or your diastolic blood pressure (the bottom number) is 80-89, your blood pressure may be higher than it should be. You should have your blood pressure rechecked within a year by a primary care provider.  If your systolic blood pressure (the top number) is 140 or greater or your diastolic blood pressure (the bottom number) is 90 or greater, you may have high blood pressure. High blood pressure is treatable, but if left untreated over time it can put you at risk for heart attack, stroke, or kidney failure. You should have your blood pressure rechecked by a primary care provider within the next 4 weeks.  If your provider in the emergency department today gave you specific instructions to follow-up with your doctor or provider even sooner than that, you should follow that instruction and not wait for up to 4 weeks for your follow-up visit.        Thank you for choosing [facility]       Thank you for choosing [facility] for your care. Our goal is always to provide you with excellent care. Hearing back from our patients is one way we can continue to improve our services. Please  "take a few minutes to complete the written survey that you may receive in the mail after you visit with us. Thank you!        [employer] Information     [email or web address] lets you send messages to your doctor, view your test results, renew your prescriptions, schedule appointments and more. To sign up, go to [email or web address] . Click on \"Log in\" on the left side of the screen, which will take you to the Welcome page. Then click on \"Sign up Now\" on the right side of the page.     You will be asked to enter the access code listed below, as well as some personal information. Please follow the directions to create your username and password.     Your access code is: [email or web address]  Expires: 2018  9:17 PM     Your access code will  in 90 days. If you need help or a new code, please call your [facility] clinic or [phone].        Care EveryWhere ID     This is your Care EveryWhere ID. This could be used by other organizations to access your [facility] medical records  [id number]        Equal Access to Services     [facility]: Hadii zaki Mendez, waniyada angella, qaybsiobhan brionesalcharla arteaga, jeremy todd . So [facility] [phone].    ATENCIÓN: Si habla español, tiene a méndez disposición servicios gratuitos de asistencia lingüística. Llame al [phone].    We comply with applicable federal civil rights laws and [place] laws. We do not discriminate on the basis of race, color, national origin, age, disability, sex, sexual orientation, or gender identity.            After Visit Summary       This is your record. Keep this with you and show to your community pharmacist(s) and doctor(s) at your next visit.                  "

## 2018-05-08 NOTE — DISCHARGE INSTRUCTIONS

## 2018-05-08 NOTE — ED PROVIDER NOTES
"  History     Chief Complaint:  Cough    HPI   Kelsie Liang is a 63 year old female, with a history of reactive airways and pneumonia, who presents with her  for evaluation of a cough. The patient reports that her symptoms of a cough began while she was abroad in [place], on a 10 day trip with her daughter.  Patient returned from this trip 2 days ago and today the patient comes in with additional complaints of a \"scratchy throat\" which began last night and concurrent mild rhinorrhea, low grade temperature.  She took 2x tylenol as well as OTC Robitussin for symptomatic relief with minimal improvement in her symptoms. She denies chest pain, shortness of breath, headache, GI symptoms.   Of note: Patient's daughter was experiencing similar cold symptoms while in [place]. Patient also reports a baseline expiratory wheeze. Patient did not take her albuterol but reports that this usually helps with shortness of breath.     Allergies:  Mobic [Meloxicam]      Medications:    Tylenol  Xanax  Wellbutrin  Cymbalta  Atarax  Ibuprofen PO  Modafinil  Zofran  Roxicodone     Past Medical History:    Anxiety  Arthritis  Bursitis  Concussion  CPAP  Depressive disorder  GERD  Heart Murmer  Hematuria   Kidney Stone  Sleep apnea  Enthesopathy of hip      Past Surgical History:    Left hand surgery (10/12/17)  Bunionectomy francesco, repair hammer toe(s) combined  Knee surgery  Laparoscopic cholecystectomy  Orthopedic surgery  Osteotomy foot  Shoulder surgery  Tonsillectomy      Family History:    The patient denies any relevant family medical history.       Social History:  The patient was accompanied to the ED by .  Smoking Status: No  Smokeless Tobacco: N/A  Alcohol Use: Yes   Marital Status:   [2]     Family History:    No family history on file.    Social History:   Patient reports that she has never smoked. She has never used smokeless tobacco. She reports that she drinks alcohol occasionally. She " reports that she does not use illicit drugs.    Marital Status:   [2]    Review of Systems   HENT: Positive for rhinorrhea and sore throat.    Respiratory: Positive for cough and shortness of breath.    All other systems reviewed and are negative.    Physical Exam   Patient Vitals for the past 24 hrs:   BP Temp Temp src Pulse Resp SpO2 Height Weight   05/07/18 2045 119/76 - - - - - - -   05/07/18 2030 110/77 - - - - - - -   05/07/18 2015 119/81 - - - - 95 % - -   05/07/18 2008 128/81 99.7  F (37.6  C) Oral 107 20 97 % - -   05/07/18 2000 - - - - - - 1.524 m (5') 70.3 kg (155 lb)       Physical Exam  Nursing notes reviewed. Vitals reviewed.  General: Alert. Well kept.  Eyes:  Conjunctiva non-injected, non-icteric.  Ears:TM s normal.  Neck/Throat: Moist mucous membranes, oropharynx clear without erythema or exudate. No cervical lymphadenopathy.  Normal voice. Clear rhinorrhea.   Cardiac: Regular rhythm. Normal heart sounds with no murmur/rubs/click.   Pulmonary: Clear and equal breath sounds bilaterally. No crackles/rales. Expiratory wheezing bilateral.  Persistent cough.   Musculoskeletal: Normal gross range of motion of all 4 extremities.    Neurological: Alert and oriented x4.   Skin: Warm and dry without rashes or petechiae. Normal appearance of visualized exposed skin.  Psych: Affect normal. Good eye contact.    Emergency Department Course   Imaging:  Chest XR,  PA & LAT   Preliminary Result   IMPRESSION: No acute cardiopulmonary disease.         Laboratory:  D DIMER 0.4    Interventions:  2022: Albuterol neb    Emergency Department Course:  Past medical records, nursing notes, and vitals reviewed.  2008: I performed an exam of the patient and obtained history, as documented above.    2100: reassessment.  States she feels dramatically improved following nebulization.  Lungs clear bilateral.   Findings and plan explained to the Patient. Patient discharged home with instructions regarding supportive care,  medications, and reasons to return. The importance of close follow-up was reviewed.       Impression & Plan    Medical Decision Making:  Kelsie Liang is a 63 year old female who presents with a cough. This is consistent with an upper respiratory tract infection. There are no signs at this point of other serious bacterial infection such as OM, RPA, epiglottitis, PTA, strep pharyngitis, pneumonia, sinusitis, meningitis, bacteremia.  Xray today negative for pneumonia or pneumothorax.  No widened mediastinum.  D-dimer obtained secondary to recent travel and negative.  PE unlikely with history of onset of symptoms with URI, similar illness in close contact, no hypoxia, no SOB.  Cardiac origin considered and unlikely. There are no concerning gastrointestinal symptoms at this point and no signs of dehydration. She had dramatic relief of symptoms with nebulization and will be discharged home with albuterol neb.  Close follow up with primary care physician is indicated. Return to UR/ED for fever, chest pain, shortness of breath, headaches.  Diagnosis:    ICD-10-CM    1. Upper respiratory infection with cough and congestion J06.9        Disposition:  discharged to home    Discharge Medications:  Discharge Medication List as of 5/7/2018  9:17 PM      START taking these medications    Details   albuterol (2.5 MG/3ML) 0.083% neb solution Take 1 vial (2.5 mg) by nebulization every 6 hours as needed for shortness of breath / dyspnea or wheezing, Disp-75 mL, R-0, Local Print      guaiFENesin-codeine (ROBITUSSIN AC) 100-10 MG/5ML SOLN solution Take 5-10 mLs by mouth nightly as needed for cough (Do not take with xanax, oxycodone, or hydroxyzine), Disp-100 mL, R-0, Local Print           Lisbet MCCABE, am serving as a scribe at 8:09 PM on 5/7/2018 to document services personally performed by Abbie Panchal CNP based on my observations and the provider's statements to me.     EMERGENCY DEPARTMENT       Abbie Panchal,  CNP  05/08/18 0017

## 2018-09-29 ENCOUNTER — HOSPITAL ENCOUNTER (EMERGENCY)
Facility: CLINIC | Age: 63
Discharge: HOME | End: 2018-09-30
Attending: EMERGENCY MEDICINE | Admitting: EMERGENCY MEDICINE
Payer: COMMERCIAL

## 2018-09-29 DIAGNOSIS — S01.312A LACERATION OF LEFT EAR, INITIAL ENCOUNTER: ICD-10-CM

## 2018-09-29 PROCEDURE — 99283 EMERGENCY DEPT VISIT LOW MDM: CPT

## 2018-09-29 PROCEDURE — 27210282 ZZH ADHESIVE DERMABOND SKIN

## 2018-09-29 PROCEDURE — 12013 RPR F/E/E/N/L/M 2.6-5.0 CM: CPT

## 2018-09-29 ASSESSMENT — ENCOUNTER SYMPTOMS
HEADACHES: 0
VOMITING: 0
WOUND: 1
DIZZINESS: 0
NAUSEA: 0

## 2018-09-29 NOTE — ED AVS SNAPSHOT
Emergency Department    [address] [facility] [address]    Phone:  [phone]    Fax:  [phone]                                       Kelsie Liang   MRN: [id number]    Department:   Emergency Department   Date of Visit:  9/29/2018           After Visit Summary Signature Page     I have received my discharge instructions, and my questions have been answered. I have discussed any challenges I see with this plan with the nurse or doctor.    ..........................................................................................................................................  Patient/Patient Representative Signature      ..........................................................................................................................................  Patient Representative Print Name and Relationship to Patient    ..................................................               ................................................  Date                                   Time    ..........................................................................................................................................  Reviewed by Signature/Title    ...................................................              ..............................................  Date                                               Time          22EPIC Rev 08/18

## 2018-09-29 NOTE — ED AVS SNAPSHOT
Emergency Department    [address] [facility] [address]    Phone:  [phone]    Fax:  [phone]                                       Kelsie Liang   MRN: [id number]    Department:   Emergency Department   Date of Visit:  9/29/2018           Patient Information     Date Of Birth          1955        Your diagnoses for this visit were:     Laceration of left ear, initial encounter        You were seen by Medhat Carroll MD.      Follow-up Information     Follow up with Iona Gabriel MD. Schedule an appointment as soon as possible for a visit in 1 week.    Specialty:  Family Practice    Why:  5-7 days, For suture removal    Contact information:    [facility]  [address]  [facility] [address]  [phone]          Follow up with  Emergency Department.    Specialty:  EMERGENCY MEDICINE    Why:  If symptoms worsen    Contact information:    [address] [facility] [address] [phone]        Discharge Instructions          * LACERATION (All Closures)  A laceration is a cut through the skin. This will usually require stitches (sutures) or staples if it is deep. Minor cuts may be treated with a tape closure ( Steri-Strips ) or Dermabond skin glue.       HOME CARE:  PAIN MEDICINE: You may use acetaminophen (Tylenol) 650-1000 mg every 6 hours or ibuprofen (Motrin, Advil) 600 mg every 6-8 hours with food to control pain, if you are able to take these medicines. [NOTE: If you have chronic liver or kidney disease or ever had a stomach ulcer or GI bleeding, talk with your doctor before using these medicines.]  EXTREMITY, FACE or TRUNK WOUNDS:    Keep the wound clean and dry. If a bandage was applied and it becomes wet or dirty, replace it. Otherwise, leave it in place for the first 24 hours.    If stitches or staples were used, clean the wound daily. Protect the wound from sunlight and tanning lamps.    After removing the bandage,  wash the area with soap and water. Use a wet cotton swab (Q tip) to loosen and remove any blood or crust that forms.    After cleaning, apply a thin layer of Polysporin or Bacitracin ointment. This will keep the wound clean and make it easier to remove the stitches or staples. Reapply a fresh bandage.    You may remove the bandage to shower as usual after the first 24 hours, but do not soak the area in water (no swimming) until the stitches or staples are removed.    If Steri-Strips were used, keep the area clean and dry. If it becomes wet, blot it dry with a towel. It is okay to take a brief shower, but avoid scrubbing the area.    If Dermabond skin adhesive was used, do not scratch, rub or pick at the adhesive film. Do not place tape directly over the film. Do not apply liquid, ointment or creams to the wound while the film is in place. Do not clean the wound with peroxide and do not apply ointments. Avoid activities that cause heavy sweating until the film has fallen off. Protect the wound from prolonged exposure to sunlight or tanning lamps. You may shower as usual but do not soak the wound in water (no baths or swimming). The film will fall off by itself in 5-10 days.  SCALP WOUNDS: During the first two days, you may carefully rinse your hair in the shower to remove blood, glass or dirt particles. After two days, you may shower and shampoo your hair normally. Do not soak your scalp in the tub or go swimming until the stitches or staples have been removed.  MOUTH WOUNDS: Eat soft foods to reduce pain. If the cut is inside of your mouth, clean by rinsing after each meal and at bedtime with a mixture of equal parts water and Hydrogen Peroxide (do not swallow!). Or, you can use a cotton swab to directly apply Hydrogen Peroxide onto the cut.  After the wound is done healing, use sunscreen over the area whenever exposed for the next 6 minths to avoid a darker scar.     FOLLOW UP: Most skin wounds heal within ten  days. Mouth and facial wounds heal within five days. However, even with proper treatment, a wound infection may sometimes occur. Therefore, you should check the wound daily for signs of infection listed below.  Stitches should be removed from the face within five days; stitches and staples should be removed from other parts of the body within 7-10 days. Unless you are told to come back to the emergency room, you may have your doctor or urgent care remove the stitches. If dissolving stitches were used in the mouth, these will fall out or dissolve without the need for removal. If tape closures ( Steri-Strips ) were used, remove them yourself if they have not fallen off after 7 days. If Dermabond skin glue was used, the film will fall off by itself in 5-10 days.      GET PROMPT MEDICAL ATTENTION  if any of the following occur:    Increasing pain in the wound    Redness, swelling or pus coming from the wound    Fever over 101 F (38.3 C) oral    If stitches or staples come apart or fall out or if Steri-Strips fall off before seven days    If the wound edges re-open    Bleeding not controlled by direct pressure    2341-8449 [employer]. [address] [place] [address]. All rights reserved. This information is not intended as a substitute for professional medical care. Always follow your healthcare professional's instructions.  This information has been modified by your health care provider with permission from the publisher.      24 Hour Appointment Hotline       To make an appointment at any [facility], call [phone] ([phone]). If you don't have a family doctor or clinic, we will help you find one. [facility] are conveniently located to serve the needs of you and your family.             Review of your medicines      Our records show that you are taking the medicines listed below. If these are incorrect, please call your family doctor or clinic.        Dose / Directions  Last dose taken    acetaminophen 325 MG tablet   Commonly known as:  TYLENOL   Dose:  650 mg   Quantity:  100 tablet        Take 2 tablets (650 mg) by mouth every 4 hours as needed for mild pain or fever   Refills:  0        albuterol (2.5 MG/3ML) 0.083% neb solution   Dose:  1 vial   Quantity:  75 mL        Take 1 vial (2.5 mg) by nebulization every 6 hours as needed for shortness of breath / dyspnea or wheezing   Refills:  0        CYMBALTA PO   Dose:  120 mg   Indication:  Major Depressive Disorder, Musculoskeletal Pain        Take 120 mg by mouth daily   Refills:  0        guaiFENesin-codeine 100-10 MG/5ML Soln solution   Commonly known as:  ROBITUSSIN AC   Dose:  1-2 tsp.   Quantity:  100 mL        Take 5-10 mLs by mouth nightly as needed for cough (Do not take with xanax, oxycodone, or hydroxyzine)   Refills:  0        hydrOXYzine 25 MG tablet   Commonly known as:  ATARAX   Dose:  25 mg   Quantity:  40 tablet        Take 1 tablet (25 mg) by mouth every 6 hours as needed for itching (and nausea, spasms, adjuvant pain)   Refills:  0        IBUPROFEN PO   Dose:  800 mg        Take 800 mg by mouth every 8 hours as needed for moderate pain   Refills:  0        ondansetron 4 MG ODT tab   Commonly known as:  ZOFRAN-ODT   Dose:  4 mg   Quantity:  15 tablet        Take 1 tablet (4 mg) by mouth every 6 hours as needed for nausea   Refills:  0        oxyCODONE IR 5 MG tablet   Commonly known as:  ROXICODONE   Dose:  5-15 mg   Quantity:  75 tablet        Take 1-3 tablets (5-15 mg) by mouth every 3 hours as needed for pain or other (Moderate to Severe)   Refills:  0        PROVIGIL PO   Dose:  400 mg        Take 400 mg by mouth every morning 2 x 200 mg tabs   Refills:  0        WELLBUTRIN XL PO   Dose:  150 mg        Take 150 mg by mouth every morning   Refills:  0        XANAX PO   Dose:  0.25 mg        Take 0.25 mg by mouth 4 times daily as needed for anxiety   Refills:  0                Orders Needing Specimen  Collection     None      Pending Results     No orders found for last 3 day(s).            Pending Culture Results     No orders found for last 3 day(s).            Pending Results Instructions     If you had any lab results that were not finalized at the time of your Discharge, you can call the ED Lab Result RN at [phone]. You will be contacted by this team for any positive Lab results or changes in treatment. The nurses are available 7 days a week from 10A to 6:30P.  You can leave a message 24 hours per day and they will return your call.        Test Results From Your Hospital Stay               Clinical Quality Measure: Blood Pressure Screening     Your blood pressure was checked while you were in the emergency department today. The last reading we obtained was  BP: 138/80 . Please read the guidelines below about what these numbers mean and what you should do about them.  If your systolic blood pressure (the top number) is less than 120 and your diastolic blood pressure (the bottom number) is less than 80, then your blood pressure is normal. There is nothing more that you need to do about it.  If your systolic blood pressure (the top number) is 120-139 or your diastolic blood pressure (the bottom number) is 80-89, your blood pressure may be higher than it should be. You should have your blood pressure rechecked within a year by a primary care provider.  If your systolic blood pressure (the top number) is 140 or greater or your diastolic blood pressure (the bottom number) is 90 or greater, you may have high blood pressure. High blood pressure is treatable, but if left untreated over time it can put you at risk for heart attack, stroke, or kidney failure. You should have your blood pressure rechecked by a primary care provider within the next 4 weeks.  If your provider in the emergency department today gave you specific instructions to follow-up with your doctor or provider even sooner than that, you should  "follow that instruction and not wait for up to 4 weeks for your follow-up visit.        Thank you for choosing [facility]       Thank you for choosing [facility] for your care. Our goal is always to provide you with excellent care. Hearing back from our patients is one way we can continue to improve our services. Please take a few minutes to complete the written survey that you may receive in the mail after you visit with us. Thank you!        [facility] Information     Whois lets you send messages to your doctor, view your test results, renew your prescriptions, schedule appointments and more. To sign up, go to [email or web address] . Click on \"Log in\" on the left side of the screen, which will take you to the Welcome page. Then click on \"Sign up Now\" on the right side of the page.     You will be asked to enter the access code listed below, as well as some personal information. Please follow the directions to create your username and password.     Your access code is: [email or web address]  Expires: 2018  1:21 AM     Your access code will  in 90 days. If you need help or a new code, please call your [facility] clinic or [phone].        Care EveryWhere ID     This is your Care EveryWhere ID. This could be used by other organizations to access your [facility] medical records  [id number]        Equal Access to Services     NATALEE HOWELL : Hadii zaki Mendez, waaxda luqadaha, qaybta kaalmada adeysabel, jeremy todd . So [facility] [phone].    ATENCIÓN: Si habla español, tiene a méndez disposición servicios gratuitos de asistencia lingüística. Llame al [phone].    We comply with applicable federal civil rights laws and [place] laws. We do not discriminate on the basis of race, color, national origin, age, disability, sex, sexual orientation, or gender identity.            After Visit Summary       This is your record. Keep this with you and show to your community pharmacist(s) " and doctor(s) at your next visit.

## 2018-09-30 VITALS
OXYGEN SATURATION: 95 % | WEIGHT: 163 LBS | RESPIRATION RATE: 18 BRPM | TEMPERATURE: 98.3 F | SYSTOLIC BLOOD PRESSURE: 117 MMHG | DIASTOLIC BLOOD PRESSURE: 73 MMHG | HEIGHT: 60 IN | BODY MASS INDEX: 32 KG/M2

## 2018-09-30 PROCEDURE — 25000125 ZZHC RX 250: Performed by: EMERGENCY MEDICINE

## 2018-09-30 RX ADMIN — Medication 3 ML: at 00:10

## 2018-09-30 NOTE — DISCHARGE INSTRUCTIONS
* LACERATION (All Closures)  A laceration is a cut through the skin. This will usually require stitches (sutures) or staples if it is deep. Minor cuts may be treated with a tape closure ( Steri-Strips ) or Dermabond skin glue.       HOME CARE:  PAIN MEDICINE: You may use acetaminophen (Tylenol) 650-1000 mg every 6 hours or ibuprofen (Motrin, Advil) 600 mg every 6-8 hours with food to control pain, if you are able to take these medicines. [NOTE: If you have chronic liver or kidney disease or ever had a stomach ulcer or GI bleeding, talk with your doctor before using these medicines.]  EXTREMITY, FACE or TRUNK WOUNDS:    Keep the wound clean and dry. If a bandage was applied and it becomes wet or dirty, replace it. Otherwise, leave it in place for the first 24 hours.    If stitches or staples were used, clean the wound daily. Protect the wound from sunlight and tanning lamps.    After removing the bandage, wash the area with soap and water. Use a wet cotton swab (Q tip) to loosen and remove any blood or crust that forms.    After cleaning, apply a thin layer of Polysporin or Bacitracin ointment. This will keep the wound clean and make it easier to remove the stitches or staples. Reapply a fresh bandage.    You may remove the bandage to shower as usual after the first 24 hours, but do not soak the area in water (no swimming) until the stitches or staples are removed.    If Steri-Strips were used, keep the area clean and dry. If it becomes wet, blot it dry with a towel. It is okay to take a brief shower, but avoid scrubbing the area.    If Dermabond skin adhesive was used, do not scratch, rub or pick at the adhesive film. Do not place tape directly over the film. Do not apply liquid, ointment or creams to the wound while the film is in place. Do not clean the wound with peroxide and do not apply ointments. Avoid activities that cause heavy sweating until the film has fallen off. Protect the wound from prolonged  exposure to sunlight or tanning lamps. You may shower as usual but do not soak the wound in water (no baths or swimming). The film will fall off by itself in 5-10 days.  SCALP WOUNDS: During the first two days, you may carefully rinse your hair in the shower to remove blood, glass or dirt particles. After two days, you may shower and shampoo your hair normally. Do not soak your scalp in the tub or go swimming until the stitches or staples have been removed.  MOUTH WOUNDS: Eat soft foods to reduce pain. If the cut is inside of your mouth, clean by rinsing after each meal and at bedtime with a mixture of equal parts water and Hydrogen Peroxide (do not swallow!). Or, you can use a cotton swab to directly apply Hydrogen Peroxide onto the cut.  After the wound is done healing, use sunscreen over the area whenever exposed for the next 6 minths to avoid a darker scar.     FOLLOW UP: Most skin wounds heal within ten days. Mouth and facial wounds heal within five days. However, even with proper treatment, a wound infection may sometimes occur. Therefore, you should check the wound daily for signs of infection listed below.  Stitches should be removed from the face within five days; stitches and staples should be removed from other parts of the body within 7-10 days. Unless you are told to come back to the emergency room, you may have your doctor or urgent care remove the stitches. If dissolving stitches were used in the mouth, these will fall out or dissolve without the need for removal. If tape closures ( Steri-Strips ) were used, remove them yourself if they have not fallen off after 7 days. If Dermabond skin glue was used, the film will fall off by itself in 5-10 days.      GET PROMPT MEDICAL ATTENTION  if any of the following occur:    Increasing pain in the wound    Redness, swelling or pus coming from the wound    Fever over 101 F (38.3 C) oral    If stitches or staples come apart or fall out or if Steri-Strips fall  off before seven days    If the wound edges re-open    Bleeding not controlled by direct pressure    9238-5021 [employer]. [address] [place] [address], [place] [address]. All rights reserved. This information is not intended as a substitute for professional medical care. Always follow your healthcare professional's instructions.  This information has been modified by your health care provider with permission from the publisher.

## 2018-09-30 NOTE — ED PROVIDER NOTES
History     Chief Complaint:  Laceration    HPI   Kelsie Liang is a 63 year old female who presents with a left ear laceration. The patient states that this evening she slipped on a rug and fell and hit her head against the corner of her wall. She suffered a left ear laceration and here in the ED reports ear pain, but denies any loss of consciousness, headaches, nausea, dizziness, or vomiting. Bleeding was controlled prior to arrival in the ED.     Allergies:  Mobic [Meloxicam]       Medications:    Tylenol  Xanax  Wellbutrin  Cymbalta  Atarax  Ibuprofen PO  Modafinil  Zofran  Roxicodone      Past Medical History:    Anxiety  Arthritis  Bursitis  Concussion  CPAP  Depressive disorder  GERD  Heart Murmer  Hematuria   Kidney Stone  Sleep apnea  Enthesopathy of hip      Past Surgical History:    Left hand surgery (10/12/17)  Bunionectomy francesco, repair hammer toe(s) combined  Knee surgery  Laparoscopic cholecystectomy  Orthopedic surgery  Osteotomy foot  Shoulder surgery  Tonsillectomy      Family History:    The patient denies any relevant family medical history.       Social History:  The patient was accompanied to the ED by .  Smoking Status: No  Smokeless Tobacco: N/A  Alcohol Use: Yes   Marital Status:   [2]        Review of Systems   HENT: Positive for ear pain.    Gastrointestinal: Negative for nausea and vomiting.   Skin: Positive for wound.   Neurological: Negative for dizziness, syncope and headaches.       Physical Exam   First Vitals:  BP: 138/80  Heart Rate: 96  Temp: 98.2  F (36.8  C)  Resp: 18  Height: 152.4 cm (5')  Weight: 73.9 kg (163 lb)  SpO2: 100 %      Physical Exam  Constitutional:  Alert, answers questions appropriately.   Neck:    Normal range of motion   HEENT:  Pupils round, equal  Ears:   A multi part, zig-zagged total length 3.5 cm over the left pinna, not through the cartiledge. There is a thin flap portion of the wound. No visible or palpable foreign body.  TM  and ear canal normal. No hematoma, dried blood, no active bleeding.  Pulmonary/Chest:  Effort normal.   Neurological:   No facial asymmetry, gait intact. GCS 15 . Cranial nerves II-XII intact. speech normal.   Psychiatric:   The patient has a normal mood and affect.         Emergency Department Course     Procedures:     Laceration Repair      LACERATION:  A multi part, complex 3.5 cm laceration.    LOCATION:  Left pinna    ANESTHESIA:  LET - Topical.    PREPARATION:  Irrigation with Normal Saline and Shur Clens.    CLOSURE:  Wound was closed with One Layer.  Skin closed with 7 x 6.0 Ethylon using interrupted sutures as well as some Derma bond. Pressure dressing applied post repair. No hematoma noted or ongoing bleeding    Interventions:  0010 - LET 3 mL Topical    Emergency Department Course:  Nursing notes and vitals reviewed.  2333: I performed an exam of the patient as documented above.     0120: I performed a laceration repair (see procedure note). Findings and plan explained to the Patient. Patient discharged home with instructions regarding supportive care, medications, and reasons to return. The importance of close follow-up was reviewed.       Impression & Plan      Medical Decision Making:  The patient presented with a laceration.  The wound was carefully evaluated and explored.  The laceration was closed with sutures as noted above.  There is no evidence of muscular, tendon, cartilage or bony damage with this laceration.  No signs of foreign body. No sign of hematoma but dressing was applied to the ear to avoid development of hematoma. The patient will remove this tomorrow. Possible complications (infection, scarring) were reviewed with the patient.  Follow up with primary care will be indicated for suture/staple removal as noted in the discharge section. No concern for serious head injury by [place] head CT rules. No role for imaging.      Diagnosis:    ICD-10-CM    1. Laceration of left ear, initial  encounter S01.312A        Disposition:  discharged to home    I, Bird Guzman, am serving as a scribe on 9/29/2018 at 11:33 PM to personally document services performed by Medhat Carroll MD based on my observations and the provider's statements to me.     Bird Guzman  9/29/2018    EMERGENCY DEPARTMENT       Medhat Carroll MD  10/01/18 0417

## 2019-05-16 ENCOUNTER — HOSPITAL ENCOUNTER (OUTPATIENT)
Dept: MAMMOGRAPHY | Facility: CLINIC | Age: 64
Discharge: HOME | End: 2019-05-16
Attending: FAMILY MEDICINE | Admitting: FAMILY MEDICINE
Payer: COMMERCIAL

## 2019-05-16 DIAGNOSIS — Z12.31 VISIT FOR SCREENING MAMMOGRAM: ICD-10-CM

## 2019-05-16 PROCEDURE — 77063 BREAST TOMOSYNTHESIS BI: CPT

## 2020-01-16 ENCOUNTER — APPOINTMENT (OUTPATIENT)
Dept: CT IMAGING | Facility: CLINIC | Age: 65
End: 2020-01-16
Attending: EMERGENCY MEDICINE
Payer: COMMERCIAL

## 2020-01-16 ENCOUNTER — HOSPITAL ENCOUNTER (OUTPATIENT)
Facility: CLINIC | Age: 65
Setting detail: OBSERVATION
Discharge: HOME | End: 2020-01-17
Attending: EMERGENCY MEDICINE | Admitting: HOSPITALIST
Payer: COMMERCIAL

## 2020-01-16 DIAGNOSIS — R11.0 NAUSEA: ICD-10-CM

## 2020-01-16 DIAGNOSIS — R10.9 ABDOMINAL PAIN, UNSPECIFIED ABDOMINAL LOCATION: Primary | ICD-10-CM

## 2020-01-16 DIAGNOSIS — R10.13 ABDOMINAL PAIN, EPIGASTRIC: ICD-10-CM

## 2020-01-16 LAB
ALBUMIN SERPL-MCNC: 4 G/DL (ref 3.4–5)
ALBUMIN UR-MCNC: NEGATIVE MG/DL
ALP SERPL-CCNC: 138 U/L (ref 40–150)
ALT SERPL W P-5'-P-CCNC: 27 U/L (ref 0–50)
ANION GAP SERPL CALCULATED.3IONS-SCNC: 9 MMOL/L (ref 3–14)
APPEARANCE UR: CLEAR
AST SERPL W P-5'-P-CCNC: 19 U/L (ref 0–45)
BASOPHILS # BLD AUTO: 0 10E9/L (ref 0–0.2)
BASOPHILS NFR BLD AUTO: 0.1 %
BILIRUB SERPL-MCNC: 0.4 MG/DL (ref 0.2–1.3)
BILIRUB UR QL STRIP: NEGATIVE
BUN SERPL-MCNC: 17 MG/DL (ref 7–30)
CALCIUM SERPL-MCNC: 9.3 MG/DL (ref 8.5–10.1)
CHLORIDE SERPL-SCNC: 110 MMOL/L (ref 94–109)
CO2 BLDCOV-SCNC: 24 MMOL/L (ref 21–28)
CO2 SERPL-SCNC: 21 MMOL/L (ref 20–32)
COLOR UR AUTO: YELLOW
CREAT SERPL-MCNC: 0.7 MG/DL (ref 0.52–1.04)
DIFFERENTIAL METHOD BLD: ABNORMAL
EOSINOPHIL # BLD AUTO: 0.1 10E9/L (ref 0–0.7)
EOSINOPHIL NFR BLD AUTO: 0.5 %
ERYTHROCYTE [DISTWIDTH] IN BLOOD BY AUTOMATED COUNT: 12.6 % (ref 10–15)
GFR SERPL CREATININE-BSD FRML MDRD: >90 ML/MIN/{1.73_M2}
GLUCOSE SERPL-MCNC: 111 MG/DL (ref 70–99)
GLUCOSE UR STRIP-MCNC: NEGATIVE MG/DL
HCT VFR BLD AUTO: 45 % (ref 35–47)
HGB BLD-MCNC: 15.3 G/DL (ref 11.7–15.7)
HGB UR QL STRIP: NEGATIVE
IMM GRANULOCYTES # BLD: 0 10E9/L (ref 0–0.4)
IMM GRANULOCYTES NFR BLD: 0.1 %
INTERPRETATION ECG - MUSE: NORMAL
KETONES UR STRIP-MCNC: NEGATIVE MG/DL
LACTATE BLD-SCNC: 2.4 MMOL/L (ref 0.7–2.1)
LEUKOCYTE ESTERASE UR QL STRIP: NEGATIVE
LIPASE SERPL-CCNC: 213 U/L (ref 73–393)
LYMPHOCYTES # BLD AUTO: 0.5 10E9/L (ref 0.8–5.3)
LYMPHOCYTES NFR BLD AUTO: 3.6 %
MCH RBC QN AUTO: 31.2 PG (ref 26.5–33)
MCHC RBC AUTO-ENTMCNC: 34 G/DL (ref 31.5–36.5)
MCV RBC AUTO: 92 FL (ref 78–100)
MONOCYTES # BLD AUTO: 0.5 10E9/L (ref 0–1.3)
MONOCYTES NFR BLD AUTO: 3.1 %
NEUTROPHILS # BLD AUTO: 13.7 10E9/L (ref 1.6–8.3)
NEUTROPHILS NFR BLD AUTO: 92.6 %
NITRATE UR QL: NEGATIVE
NRBC # BLD AUTO: 0 10*3/UL
NRBC BLD AUTO-RTO: 0 /100
PCO2 BLDV: 24 MM HG (ref 40–50)
PH BLDV: 7.61 PH (ref 7.32–7.43)
PH UR STRIP: 8 PH (ref 5–7)
PLATELET # BLD AUTO: 352 10E9/L (ref 150–450)
PO2 BLDV: 27 MM HG (ref 25–47)
POTASSIUM SERPL-SCNC: 3.9 MMOL/L (ref 3.4–5.3)
PROT SERPL-MCNC: 7.6 G/DL (ref 6.8–8.8)
RBC # BLD AUTO: 4.91 10E12/L (ref 3.8–5.2)
RBC #/AREA URNS AUTO: 5 /HPF (ref 0–2)
SAO2 % BLDV FROM PO2: 65 %
SODIUM SERPL-SCNC: 140 MMOL/L (ref 133–144)
SOURCE: ABNORMAL
SP GR UR STRIP: 1.01 (ref 1–1.03)
SQUAMOUS #/AREA URNS AUTO: 1 /HPF (ref 0–1)
TROPONIN I SERPL-MCNC: <0.015 UG/L (ref 0–0.04)
UROBILINOGEN UR STRIP-MCNC: NORMAL MG/DL (ref 0–2)
WBC # BLD AUTO: 14.6 10E9/L (ref 4–11)
WBC #/AREA URNS AUTO: 3 /HPF (ref 0–5)

## 2020-01-16 PROCEDURE — 84484 ASSAY OF TROPONIN QUANT: CPT | Performed by: EMERGENCY MEDICINE

## 2020-01-16 PROCEDURE — 96374 THER/PROPH/DIAG INJ IV PUSH: CPT | Mod: 59

## 2020-01-16 PROCEDURE — 81001 URINALYSIS AUTO W/SCOPE: CPT | Performed by: EMERGENCY MEDICINE

## 2020-01-16 PROCEDURE — 80053 COMPREHEN METABOLIC PANEL: CPT | Performed by: EMERGENCY MEDICINE

## 2020-01-16 PROCEDURE — 96376 TX/PRO/DX INJ SAME DRUG ADON: CPT

## 2020-01-16 PROCEDURE — 83690 ASSAY OF LIPASE: CPT | Performed by: EMERGENCY MEDICINE

## 2020-01-16 PROCEDURE — 99220 ZZC INITIAL OBSERVATION CARE,LEVL III: CPT | Performed by: HOSPITALIST

## 2020-01-16 PROCEDURE — 25000128 H RX IP 250 OP 636: Performed by: EMERGENCY MEDICINE

## 2020-01-16 PROCEDURE — 99285 EMERGENCY DEPT VISIT HI MDM: CPT | Mod: 25

## 2020-01-16 PROCEDURE — 83605 ASSAY OF LACTIC ACID: CPT

## 2020-01-16 PROCEDURE — 25000125 ZZHC RX 250: Performed by: EMERGENCY MEDICINE

## 2020-01-16 PROCEDURE — 25000128 H RX IP 250 OP 636: Performed by: HOSPITALIST

## 2020-01-16 PROCEDURE — 74177 CT ABD & PELVIS W/CONTRAST: CPT

## 2020-01-16 PROCEDURE — 25800030 ZZH RX IP 258 OP 636: Performed by: EMERGENCY MEDICINE

## 2020-01-16 PROCEDURE — 96361 HYDRATE IV INFUSION ADD-ON: CPT

## 2020-01-16 PROCEDURE — 96375 TX/PRO/DX INJ NEW DRUG ADDON: CPT

## 2020-01-16 PROCEDURE — 85025 COMPLETE CBC W/AUTO DIFF WBC: CPT | Performed by: EMERGENCY MEDICINE

## 2020-01-16 PROCEDURE — 82803 BLOOD GASES ANY COMBINATION: CPT

## 2020-01-16 RX ORDER — SODIUM CHLORIDE 9 MG/ML
1000 INJECTION, SOLUTION INTRAVENOUS CONTINUOUS
Status: DISCONTINUED | OUTPATIENT
Start: 2020-01-16 | End: 2020-01-16

## 2020-01-16 RX ORDER — ONDANSETRON 4 MG/1
4 TABLET, ORALLY DISINTEGRATING ORAL EVERY 6 HOURS PRN
Status: DISCONTINUED | OUTPATIENT
Start: 2020-01-16 | End: 2020-01-17 | Stop reason: HOSPADM

## 2020-01-16 RX ORDER — ONDANSETRON 2 MG/ML
4 INJECTION INTRAMUSCULAR; INTRAVENOUS EVERY 6 HOURS PRN
Status: DISCONTINUED | OUTPATIENT
Start: 2020-01-16 | End: 2020-01-17 | Stop reason: HOSPADM

## 2020-01-16 RX ORDER — HYDROMORPHONE HYDROCHLORIDE 1 MG/ML
.2-.3 INJECTION, SOLUTION INTRAMUSCULAR; INTRAVENOUS; SUBCUTANEOUS
Status: DISCONTINUED | OUTPATIENT
Start: 2020-01-16 | End: 2020-01-17 | Stop reason: HOSPADM

## 2020-01-16 RX ORDER — AMOXICILLIN 250 MG
1 CAPSULE ORAL 2 TIMES DAILY PRN
Status: DISCONTINUED | OUTPATIENT
Start: 2020-01-16 | End: 2020-01-17 | Stop reason: HOSPADM

## 2020-01-16 RX ORDER — IOPAMIDOL 755 MG/ML
75 INJECTION, SOLUTION INTRAVASCULAR ONCE
Status: COMPLETED | OUTPATIENT
Start: 2020-01-16 | End: 2020-01-16

## 2020-01-16 RX ORDER — HYDROMORPHONE HYDROCHLORIDE 1 MG/ML
0.5 INJECTION, SOLUTION INTRAMUSCULAR; INTRAVENOUS; SUBCUTANEOUS
Status: DISCONTINUED | OUTPATIENT
Start: 2020-01-16 | End: 2020-01-17 | Stop reason: HOSPADM

## 2020-01-16 RX ORDER — DULOXETIN HYDROCHLORIDE 60 MG/1
60 CAPSULE, DELAYED RELEASE ORAL AT BEDTIME
COMMUNITY

## 2020-01-16 RX ORDER — NALOXONE HYDROCHLORIDE 0.4 MG/ML
.1-.4 INJECTION, SOLUTION INTRAMUSCULAR; INTRAVENOUS; SUBCUTANEOUS
Status: DISCONTINUED | OUTPATIENT
Start: 2020-01-16 | End: 2020-01-17 | Stop reason: HOSPADM

## 2020-01-16 RX ORDER — LORAZEPAM 2 MG/ML
1 INJECTION INTRAMUSCULAR ONCE
Status: COMPLETED | OUTPATIENT
Start: 2020-01-16 | End: 2020-01-16

## 2020-01-16 RX ORDER — ACETAMINOPHEN 325 MG/1
650 TABLET ORAL EVERY 4 HOURS PRN
Status: DISCONTINUED | OUTPATIENT
Start: 2020-01-16 | End: 2020-01-17 | Stop reason: HOSPADM

## 2020-01-16 RX ORDER — LIDOCAINE 40 MG/G
CREAM TOPICAL
Status: DISCONTINUED | OUTPATIENT
Start: 2020-01-16 | End: 2020-01-17 | Stop reason: HOSPADM

## 2020-01-16 RX ORDER — BUPROPION HYDROCHLORIDE 300 MG/1
300 TABLET ORAL EVERY MORNING
COMMUNITY

## 2020-01-16 RX ORDER — PROCHLORPERAZINE MALEATE 10 MG
10 TABLET ORAL EVERY 6 HOURS PRN
Status: DISCONTINUED | OUTPATIENT
Start: 2020-01-16 | End: 2020-01-17 | Stop reason: HOSPADM

## 2020-01-16 RX ORDER — PROCHLORPERAZINE 25 MG
25 SUPPOSITORY, RECTAL RECTAL EVERY 12 HOURS PRN
Status: DISCONTINUED | OUTPATIENT
Start: 2020-01-16 | End: 2020-01-17 | Stop reason: HOSPADM

## 2020-01-16 RX ORDER — POLYETHYLENE GLYCOL 3350 17 G/17G
17 POWDER, FOR SOLUTION ORAL DAILY PRN
Status: DISCONTINUED | OUTPATIENT
Start: 2020-01-16 | End: 2020-01-17 | Stop reason: HOSPADM

## 2020-01-16 RX ORDER — LANOLIN ALCOHOL/MO/W.PET/CERES
3 CREAM (GRAM) TOPICAL
Status: DISCONTINUED | OUTPATIENT
Start: 2020-01-16 | End: 2020-01-17 | Stop reason: HOSPADM

## 2020-01-16 RX ORDER — SODIUM CHLORIDE 9 MG/ML
1000 INJECTION, SOLUTION INTRAVENOUS CONTINUOUS
Status: DISCONTINUED | OUTPATIENT
Start: 2020-01-16 | End: 2020-01-17 | Stop reason: HOSPADM

## 2020-01-16 RX ORDER — ACETAMINOPHEN 650 MG/1
650 SUPPOSITORY RECTAL EVERY 4 HOURS PRN
Status: DISCONTINUED | OUTPATIENT
Start: 2020-01-16 | End: 2020-01-17 | Stop reason: HOSPADM

## 2020-01-16 RX ORDER — LORAZEPAM 2 MG/ML
.5-1 INJECTION INTRAMUSCULAR EVERY 4 HOURS PRN
Status: DISCONTINUED | OUTPATIENT
Start: 2020-01-16 | End: 2020-01-17 | Stop reason: HOSPADM

## 2020-01-16 RX ORDER — BISACODYL 10 MG
10 SUPPOSITORY, RECTAL RECTAL DAILY PRN
Status: DISCONTINUED | OUTPATIENT
Start: 2020-01-16 | End: 2020-01-17 | Stop reason: HOSPADM

## 2020-01-16 RX ORDER — ALPRAZOLAM 0.25 MG
0.25 TABLET ORAL 4 TIMES DAILY PRN
COMMUNITY
End: 2022-02-12

## 2020-01-16 RX ORDER — OXYCODONE HYDROCHLORIDE 5 MG/1
5-10 TABLET ORAL
Status: DISCONTINUED | OUTPATIENT
Start: 2020-01-16 | End: 2020-01-17 | Stop reason: HOSPADM

## 2020-01-16 RX ORDER — SODIUM CHLORIDE, SODIUM LACTATE, POTASSIUM CHLORIDE, CALCIUM CHLORIDE 600; 310; 30; 20 MG/100ML; MG/100ML; MG/100ML; MG/100ML
INJECTION, SOLUTION INTRAVENOUS CONTINUOUS
Status: DISCONTINUED | OUTPATIENT
Start: 2020-01-16 | End: 2020-01-17 | Stop reason: HOSPADM

## 2020-01-16 RX ORDER — ONDANSETRON 2 MG/ML
4 INJECTION INTRAMUSCULAR; INTRAVENOUS EVERY 30 MIN PRN
Status: DISCONTINUED | OUTPATIENT
Start: 2020-01-16 | End: 2020-01-17 | Stop reason: HOSPADM

## 2020-01-16 RX ORDER — AMOXICILLIN 250 MG
2 CAPSULE ORAL 2 TIMES DAILY PRN
Status: DISCONTINUED | OUTPATIENT
Start: 2020-01-16 | End: 2020-01-17 | Stop reason: HOSPADM

## 2020-01-16 RX ORDER — MODAFINIL 200 MG/1
400 TABLET ORAL DAILY
COMMUNITY

## 2020-01-16 RX ADMIN — SODIUM CHLORIDE 1000 ML: 9 INJECTION, SOLUTION INTRAVENOUS at 19:40

## 2020-01-16 RX ADMIN — IOPAMIDOL 75 ML: 755 INJECTION, SOLUTION INTRAVENOUS at 20:30

## 2020-01-16 RX ADMIN — HYDROMORPHONE HYDROCHLORIDE 0.5 MG: 1 INJECTION, SOLUTION INTRAMUSCULAR; INTRAVENOUS; SUBCUTANEOUS at 19:32

## 2020-01-16 RX ADMIN — LORAZEPAM 1 MG: 2 INJECTION INTRAMUSCULAR; INTRAVENOUS at 21:59

## 2020-01-16 RX ADMIN — ONDANSETRON 4 MG: 2 INJECTION INTRAMUSCULAR; INTRAVENOUS at 21:39

## 2020-01-16 RX ADMIN — SODIUM CHLORIDE 1000 ML: 9 INJECTION, SOLUTION INTRAVENOUS at 21:39

## 2020-01-16 RX ADMIN — HYDROMORPHONE HYDROCHLORIDE 0.5 MG: 1 INJECTION, SOLUTION INTRAMUSCULAR; INTRAVENOUS; SUBCUTANEOUS at 21:41

## 2020-01-16 RX ADMIN — SODIUM CHLORIDE 63 ML: 9 INJECTION, SOLUTION INTRAVENOUS at 20:31

## 2020-01-16 ASSESSMENT — ENCOUNTER SYMPTOMS
NAUSEA: 1
ABDOMINAL DISTENTION: 0
DIARRHEA: 0
ABDOMINAL PAIN: 1
VOMITING: 1

## 2020-01-16 ASSESSMENT — MIFFLIN-ST. JEOR
SCORE: 1136.03
SCORE: 1136.03

## 2020-01-17 ENCOUNTER — APPOINTMENT (OUTPATIENT)
Dept: ULTRASOUND IMAGING | Facility: CLINIC | Age: 65
End: 2020-01-17
Attending: HOSPITALIST
Payer: COMMERCIAL

## 2020-01-17 VITALS
RESPIRATION RATE: 18 BRPM | SYSTOLIC BLOOD PRESSURE: 109 MMHG | DIASTOLIC BLOOD PRESSURE: 70 MMHG | OXYGEN SATURATION: 99 % | HEIGHT: 59 IN | WEIGHT: 153.5 LBS | TEMPERATURE: 98.1 F | BODY MASS INDEX: 30.95 KG/M2 | HEART RATE: 100 BPM

## 2020-01-17 LAB
ALBUMIN SERPL-MCNC: 2.8 G/DL (ref 3.4–5)
ALP SERPL-CCNC: 94 U/L (ref 40–150)
ALT SERPL W P-5'-P-CCNC: 20 U/L (ref 0–50)
ANION GAP SERPL CALCULATED.3IONS-SCNC: 5 MMOL/L (ref 3–14)
AST SERPL W P-5'-P-CCNC: 9 U/L (ref 0–45)
BASOPHILS # BLD AUTO: 0 10E9/L (ref 0–0.2)
BASOPHILS NFR BLD AUTO: 0.1 %
BILIRUB SERPL-MCNC: 0.4 MG/DL (ref 0.2–1.3)
BUN SERPL-MCNC: 16 MG/DL (ref 7–30)
CALCIUM SERPL-MCNC: 7.6 MG/DL (ref 8.5–10.1)
CHLORIDE SERPL-SCNC: 113 MMOL/L (ref 94–109)
CO2 SERPL-SCNC: 22 MMOL/L (ref 20–32)
CREAT SERPL-MCNC: 0.72 MG/DL (ref 0.52–1.04)
DIFFERENTIAL METHOD BLD: NORMAL
EOSINOPHIL # BLD AUTO: 0.1 10E9/L (ref 0–0.7)
EOSINOPHIL NFR BLD AUTO: 0.8 %
ERYTHROCYTE [DISTWIDTH] IN BLOOD BY AUTOMATED COUNT: 13.1 % (ref 10–15)
GFR SERPL CREATININE-BSD FRML MDRD: 88 ML/MIN/{1.73_M2}
GLUCOSE SERPL-MCNC: 108 MG/DL (ref 70–99)
HCT VFR BLD AUTO: 35.9 % (ref 35–47)
HGB BLD-MCNC: 11.7 G/DL (ref 11.7–15.7)
IMM GRANULOCYTES # BLD: 0 10E9/L (ref 0–0.4)
IMM GRANULOCYTES NFR BLD: 0.1 %
LACTATE BLD-SCNC: 1.1 MMOL/L (ref 0.7–2)
LYMPHOCYTES # BLD AUTO: 1 10E9/L (ref 0.8–5.3)
LYMPHOCYTES NFR BLD AUTO: 13.5 %
MCH RBC QN AUTO: 30.6 PG (ref 26.5–33)
MCHC RBC AUTO-ENTMCNC: 32.6 G/DL (ref 31.5–36.5)
MCV RBC AUTO: 94 FL (ref 78–100)
MONOCYTES # BLD AUTO: 0.3 10E9/L (ref 0–1.3)
MONOCYTES NFR BLD AUTO: 4.7 %
NEUTROPHILS # BLD AUTO: 5.8 10E9/L (ref 1.6–8.3)
NEUTROPHILS NFR BLD AUTO: 80.8 %
NRBC # BLD AUTO: 0 10*3/UL
NRBC BLD AUTO-RTO: 0 /100
PLATELET # BLD AUTO: 266 10E9/L (ref 150–450)
POTASSIUM SERPL-SCNC: 3.7 MMOL/L (ref 3.4–5.3)
PROT SERPL-MCNC: 5.5 G/DL (ref 6.8–8.8)
RBC # BLD AUTO: 3.82 10E12/L (ref 3.8–5.2)
SODIUM SERPL-SCNC: 140 MMOL/L (ref 133–144)
TROPONIN I SERPL-MCNC: <0.015 UG/L (ref 0–0.04)
WBC # BLD AUTO: 7.2 10E9/L (ref 4–11)

## 2020-01-17 PROCEDURE — 85025 COMPLETE CBC W/AUTO DIFF WBC: CPT | Performed by: HOSPITALIST

## 2020-01-17 PROCEDURE — C9113 INJ PANTOPRAZOLE SODIUM, VIA: HCPCS | Performed by: HOSPITALIST

## 2020-01-17 PROCEDURE — 84484 ASSAY OF TROPONIN QUANT: CPT | Performed by: HOSPITALIST

## 2020-01-17 PROCEDURE — 36415 COLL VENOUS BLD VENIPUNCTURE: CPT | Performed by: HOSPITALIST

## 2020-01-17 PROCEDURE — 80053 COMPREHEN METABOLIC PANEL: CPT | Performed by: HOSPITALIST

## 2020-01-17 PROCEDURE — 99217 ZZC OBSERVATION CARE DISCHARGE: CPT | Performed by: STUDENT IN AN ORGANIZED HEALTH CARE EDUCATION/TRAINING PROGRAM

## 2020-01-17 PROCEDURE — 83605 ASSAY OF LACTIC ACID: CPT | Performed by: HOSPITALIST

## 2020-01-17 PROCEDURE — 96361 HYDRATE IV INFUSION ADD-ON: CPT

## 2020-01-17 PROCEDURE — 96375 TX/PRO/DX INJ NEW DRUG ADDON: CPT

## 2020-01-17 PROCEDURE — 40000275 ZZH STATISTIC RCP TIME EA 10 MIN

## 2020-01-17 PROCEDURE — 25800030 ZZH RX IP 258 OP 636: Performed by: EMERGENCY MEDICINE

## 2020-01-17 PROCEDURE — 25800030 ZZH RX IP 258 OP 636: Performed by: HOSPITALIST

## 2020-01-17 PROCEDURE — G0378 HOSPITAL OBSERVATION PER HR: HCPCS

## 2020-01-17 PROCEDURE — 93005 ELECTROCARDIOGRAM TRACING: CPT

## 2020-01-17 PROCEDURE — 76700 US EXAM ABDOM COMPLETE: CPT

## 2020-01-17 PROCEDURE — 25000128 H RX IP 250 OP 636: Performed by: HOSPITALIST

## 2020-01-17 RX ORDER — PANTOPRAZOLE SODIUM 40 MG/1
40 TABLET, DELAYED RELEASE ORAL
Status: DISCONTINUED | OUTPATIENT
Start: 2020-01-18 | End: 2020-01-17 | Stop reason: HOSPADM

## 2020-01-17 RX ORDER — PANTOPRAZOLE SODIUM 40 MG/1
40 TABLET, DELAYED RELEASE ORAL
Qty: 30 TABLET | Refills: 0 | Status: SHIPPED | OUTPATIENT
Start: 2020-01-18 | End: 2022-02-12

## 2020-01-17 RX ORDER — ONDANSETRON 4 MG/1
4 TABLET, FILM COATED ORAL EVERY 8 HOURS PRN
Qty: 10 TABLET | Refills: 0 | Status: SHIPPED | OUTPATIENT
Start: 2020-01-17 | End: 2022-02-12

## 2020-01-17 RX ORDER — OXYCODONE HYDROCHLORIDE 5 MG/1
5 TABLET ORAL EVERY 6 HOURS PRN
Qty: 14 TABLET | Refills: 0 | Status: SHIPPED | OUTPATIENT
Start: 2020-01-17 | End: 2020-01-17

## 2020-01-17 RX ORDER — HYDROMORPHONE HYDROCHLORIDE 2 MG/1
2 TABLET ORAL EVERY 6 HOURS PRN
Qty: 10 TABLET | Refills: 0 | Status: SHIPPED | OUTPATIENT
Start: 2020-01-17 | End: 2020-01-20

## 2020-01-17 RX ADMIN — SODIUM CHLORIDE, POTASSIUM CHLORIDE, SODIUM LACTATE AND CALCIUM CHLORIDE: 600; 310; 30; 20 INJECTION, SOLUTION INTRAVENOUS at 01:08

## 2020-01-17 RX ADMIN — SODIUM CHLORIDE 1000 ML: 9 INJECTION, SOLUTION INTRAVENOUS at 00:02

## 2020-01-17 RX ADMIN — PANTOPRAZOLE SODIUM 40 MG: 40 INJECTION, POWDER, FOR SOLUTION INTRAVENOUS at 00:05

## 2020-01-17 ASSESSMENT — MIFFLIN-ST. JEOR: SCORE: 1151.9

## 2020-01-17 NOTE — PROGRESS NOTES
RECEIVING UNIT ED HANDOFF REVIEW    ED Nurse Handoff Report was reviewed by: Gibran Garza RN on January 16, 2020 at 10:30 PM

## 2020-01-17 NOTE — ED NOTES
"[facility]  ED Nurse Handoff Report    ED Chief complaint: Abdominal Pain      ED Diagnosis:   Final diagnoses:   Abdominal pain, epigastric   Nausea       Code Status: Full Code    Allergies:   Allergies   Allergen Reactions     Mobic [Meloxicam]      Analgesics-antiinflammatory=night swets       Patient Story:  The patient developed aching right upper quadrant abdominal pain with radiation to the back at 1100 this morning  Focused Assessment:  Pt. Alert and orient. Times 3, having bilat. UQ and periumbilical pain with intermittant nausea.     Treatments and/or interventions provided: NaCl time 2 liters bolus, Dilaudid 0.5mg IVP times 2, Zofran 4mg IVP times 2, Ativan 0.5mg IVP.   Patient's response to treatments and/or interventions: Short term relief to abd. Pain and nausea.     To be done/followed up on inpatient unit:  Nothing    Does this patient have any cognitive concerns?: None    Activity level - Baseline/Home:  Independent  Activity Level - Current:   Independent    Patient's Preferred language: English   Needed?: No    Isolation: None  Infection: Not Applicable  Bariatric?: No    Vital Signs:   Vitals:    01/16/20 1808   BP: 107/74   Pulse: 115   Resp: 18   Temp: 97.4  F (36.3  C)   TempSrc: Temporal   SpO2: 98%   Weight: 68 kg (150 lb)   Height: 1.499 m (4' 11\")       Cardiac Rhythm:     Was the PSS-3 completed:   Yes  What interventions are required if any?               Family Comments:  present  OBS brochure/video discussed/provided to patient/family: Yes              Name of person given brochure if not patient:                Relationship to patient:      For the majority of the shift this patient's behavior was Green.   Behavioral interventions performed were None.    ED NURSE PHONE NUMBER: [phone]         "

## 2020-01-17 NOTE — CONSULTS
[facility]  Gastroenterology Consultation         Kelsie Liang  [address] [facility] [id number]  64 year old female    Admission Date/Time: 1/16/2020  Primary Care Provider: Iona Gabriel  Referring / Attending Physician:  Dr. Chad Haas    We were asked to see the patient in consultation by Dr. Chad Haas for evaluation of abdominal pain.      CC: abdominal pain    HPI:  Kelsie Liang is a 64 year old female who presented to ED with abdominal pain that started acutely on 01/16. This was associated with nausea without vomiting or diarrhea. Denies chest pain, fever, chills, heartburn, dysphagia, constipation, bloody stools or emesis. Has a past medical history of prior cholecystectomy, Sphincter of Oddi dysfunction, ARSENIO on CPAP, GERD, nephrolithiasis, and chronic depression and anxiety. She states that pain she had (that is now significantly improved) is similar to pain with sphincter of Oddi dysfunction. She underwent left shoulder arthroplasty on month ago.    Patient is afebrile, normotensive, no requiring supplemental O2. Labs noted elevated WBC that have normalized. CMP unremarkable. CT of abdomen notes not cause for pain. Abdominal ultrasound notes no cause for pain.    ROS: A comprehensive ten point review of systems was negative aside from those in mentioned in the HPI.      PAST MED HX:  I have reviewed this patient's medical history and updated it with pertinent information if needed.   Past Medical History:   Diagnosis Date     Anxiety      Arthritis      Bursitis     bilat knees     Concussion 1/28/2016    FELL ON THE ICE     CPAP (continuous positive airway pressure) dependence      Depressive disorder      Gastro-oesophageal reflux disease      Heart murmur      Hematuria      Kidney stone      Sleep apnea     CPAP       MEDICATIONS:   Prior to Admission Medications   Prescriptions Last Dose Informant Patient Reported? Taking?   ALPRAZolam  (XANAX) 0.25 MG tablet PRN  Yes Yes   Sig: Take 0.25 mg by mouth 4 times daily as needed for anxiety   DULoxetine (CYMBALTA) 60 MG capsule 1/16/2020 at AM  Yes Yes   Sig: Take 60 mg by mouth daily   acetaminophen (TYLENOL) 325 MG tablet PRN Self No Yes   Sig: Take 2 tablets (650 mg) by mouth every 4 hours as needed for mild pain or fever   buPROPion (WELLBUTRIN XL) 300 MG 24 hr tablet 1/16/2020 at Unknown time  Yes Yes   Sig: Take 300 mg by mouth every morning   modafinil (PROVIGIL) 200 MG tablet 1/16/2020 at AM  Yes Yes   Sig: Take 400 mg by mouth daily   ondansetron (ZOFRAN-ODT) 4 MG disintegrating tablet PRN Self No Yes   Sig: Take 1 tablet (4 mg) by mouth every 6 hours as needed for nausea      Facility-Administered Medications: None       ALLERGIES:   Allergies   Allergen Reactions     Mobic [Meloxicam]      Analgesics-antiinflammatory=night swets       SOCIAL HISTORY:  Social History     Tobacco Use     Smoking status: Never Smoker     Smokeless tobacco: Never Used   Substance Use Topics     Alcohol use: Yes     Comment: occassionally     Drug use: No       FAMILY HISTORY:  No family history on file.    PHYSICAL EXAM:   General  Alert, oriented and comfortable  Vital Signs with Ranges  Temp: 98.1  F (36.7  C) Temp src: Oral BP: 109/70 Pulse: 100   Resp: 18 SpO2: 99 % O2 Device: None (Room air) Oxygen Delivery: 2 LPM  No intake/output data recorded.    Constitutional: healthy, alert and no distress   Cardiovascular: negative, PMI normal. No lifts, heaves, or thrills. RRR. No murmurs, clicks gallops or rub  Respiratory: negative, Percussion normal. Good diaphragmatic excursion. Lungs clear  Abdomen: Abdomen soft, tender. BS normal. No masses, organomegaly, positive findings: tenderness moderate RUQ over 11-12 rib- consistent with musculoskeletal/costochondritis cause        ADDITIONAL COMMENTS:   I reviewed the patient's new clinical lab test results.   Recent Labs   Lab Test 01/17/20  0710 01/16/20  1934  11/03/17  0800 11/02/17  0810   WBC 7.2 14.6* 5.2 6.2   HGB 11.7 15.3  --  10.8*   MCV 94 92  --  95    352  --  243     Recent Labs   Lab Test 01/17/20  0710 01/16/20 1934 11/02/17  0810   POTASSIUM 3.7 3.9 4.2   CHLORIDE 113* 110* 106   CO2 22 21 27   BUN 16 17 11   ANIONGAP 5 9 5     Recent Labs   Lab Test 01/17/20  0710 01/16/20  1936 01/16/20  1934 09/03/17  2028 12/08/15  1125 12/07/15  2127  11/16/13  0747   ALBUMIN 2.8*  --  4.0  --   --  3.9  --  2.7*   BILITOTAL 0.4  --  0.4  --   --  0.3  --  0.3   ALT 20  --  27  --   --  31  --  35   AST 9  --  19  --   --  18  --  14   PROTEIN  --  Negative  --  10* Negative  --    < >  --    LIPASE  --   --  213  --   --  297  --  62    < > = values in this interval not displayed.       I reviewed the patient's new imaging results.        CONSULTATION ASSESSMENT AND PLAN:   Kelsie Liang is a pleasant 64 year old female with acute onset RUQ abdominal pain associated with nausea. Now resolved. GI was consulted to further evaluate     Active Problems:    Abdominal pain  Patient has had similar pain in past associated with Sphincter of Oddi dysfunction. Has had a negative workup and symptoms seem unrelated to eating. On exam patient has pain on 11-12 rib and consistent with muscle strain. Other causes could include PUD or recurrent episode of sphincter of Oddi dysfunction.    - Ok with [facility] to discharge patient with plans to f/u as an outpatient in 1-2 weeks. Can determine if EGD and endoscopic ultrasound is necessary  - Recommend to have patient start pantoprazole 40 mg daily         [facility].  Office: [phone]  Cell : [phone] (Dr. Burrell)  Cell: [phone] (Kat Carver PA-C)

## 2020-01-17 NOTE — DISCHARGE SUMMARY
[facility]  Hospitalist Discharge Summary       Date of Admission:  1/16/2020  Date of Discharge:  1/17/2020  Discharging Provider: Klever Munoz MD      Discharge Diagnoses     Abdominal Pain    Follow-ups Needed After Discharge   Follow-up Appointments     Follow-up and recommended labs and tests       Follow up with primary care provider, Iona Gabriel, within 7 days for   hospital follow- up.  The following labs/tests are recommended: None.           Unresulted Labs Ordered in the Past 30 Days of this Admission     No orders found for last 31 day(s).        Discharge Disposition   Discharged to home  Condition at discharge: Stable    Hospital Course   Kelsie Liang is a very pleasant 64 year old woman with past history that is most significant for prior cholecystectomy, Sphincter of Oddi dysfunction, ARSENIO on CPAP, GERD, nephrolithiasis, and chronic depression and anxiety who presents with abdominal pain of as yet unclear etiology.     PLAN     1) Abdominal pain causing SIRS: Cause unclear; she has tachycardia, leukocytosis and elevated Lactate; without fever, hypotension. CT negative for acute pathology such as enteritis or obstruction or perforation, or nephrolithiasis; hepatic panel and Lipase in normal range. Choledocholithiasis seems less likely at present. Mesenteric ischemia considered but her pain is not markedly out of proportion to the exam at present. Symptoms resolved and work up was negative.   Plan:  -  -- GI consulted -> no acute GI causes found, possible MSK in etiology    -- discharge with pain control and anti-emetics as needed    -- Abdominal US orderedr -> no acute findings     2) ARSENIO: Continue CPAP     4) Chronic depression and anxiety: On Xanax, Cymbalta, Wellbutrin and Provigil. Resume home medications at discharge.     5) Chronic benign hematuria: reportedly with negative prior cystoscopy. Noted.     Consultations This Hospital Stay   GASTROENTEROLOGY IP  CONSULT    Code Status   Full Code    Time Spent on this Encounter   I, Klever Munoz MD, personally saw the patient today and spent greater than 30 minutes discharging this patient.       Klever Munoz MD  [facility]  ______________________________________________________________________    Physical Exam   Vital Signs: Temp: 98.1  F (36.7  C) Temp src: Oral BP: 109/70 Pulse: 100   Resp: 18 SpO2: 99 % O2 Device: None (Room air) Oxygen Delivery: 2 LPM  Weight: 153 lbs 8 oz       Primary Care Physician   Iona Gabriel    Discharge Orders      Reason for your hospital stay    You had abdominal pain and needed gastroenterology eval and pain control.     Follow-up and recommended labs and tests     Follow up with primary care provider, Iona Gabriel, within 7 days for hospital follow- up.  The following labs/tests are recommended: None.     Activity    Your activity upon discharge: activity as tolerated     Diet    Follow this diet upon discharge: Orders Placed This Encounter      Regular Diet Adult       Significant Results and Procedures   Most Recent 3 CBC's:  Recent Labs   Lab Test 01/17/20  0710 01/16/20 1934 11/03/17  0800 11/02/17  0810   WBC 7.2 14.6* 5.2 6.2   HGB 11.7 15.3  --  10.8*   MCV 94 92  --  95    352  --  243     Most Recent 3 BMP's:  Recent Labs   Lab Test 01/17/20  0710 01/16/20 1934 11/02/17  0810    140 138   POTASSIUM 3.7 3.9 4.2   CHLORIDE 113* 110* 106   CO2 22 21 27   BUN 16 17 11   CR 0.72 0.70 0.79   ANIONGAP 5 9 5   JOANNA 7.6* 9.3 8.8   * 111* 101*     Most Recent 2 LFT's:  Recent Labs   Lab Test 01/17/20  0710 01/16/20 1934   AST 9 19   ALT 20 27   ALKPHOS 94 138   BILITOTAL 0.4 0.4   ,   Results for orders placed or performed during the hospital encounter of 01/16/20   CT Abdomen Pelvis w Contrast    Narrative    CT ABDOMEN AND PELVIS WITH CONTRAST 1/16/2020 8:42 PM     HISTORY: Abdominal distension.    COMPARISON: September 3, 2017    TECHNIQUE:  Volumetric helical acquisition of CT images from the lung  bases through the symphysis pubis after the administration of 75mL  Isovue-370 intravenous contrast. Radiation dose for this scan was  reduced using automated exposure control, adjustment of the mA and/or  kV according to patient size, or iterative reconstruction technique.    FINDINGS: The liver, bilateral kidneys and adrenal glands, pancreas,  and spleen demonstrate no worrisome focal lesion. No hydronephrosis.  Cholecystectomy change. Normal caliber aorta. No diverticulitis.  Appendix not seen. There is no free fluid in the abdomen or pelvis. No  free air in the abdomen. Bone windows reveal no destructive lesions.  There are no abdominal or pelvic lymph nodes that are abnormal by size  criteria. The visualized lung bases are unremarkable. There are no  dilated loops of small bowel or colon.      Impression    IMPRESSION: No acute process demonstrated within the abdomen and  pelvis.    MANDO CALLAWAY MD   US Abdomen Complete    Narrative    ULTRASOUND ABDOMEN COMPLETE 1/17/2020 8:06 AM     HISTORY: Abdominal pain of unclear etiology.    COMPARISON: None.    FINDINGS:  Liver is unremarkable in echogenicity without focal solid  lesions.  Gallbladder is surgically absent.   Extrahepatic bile duct  is normal in diameter. Pancreas is normal where visualized. Spleen is  normal. Kidneys are normal in size. There is no hydronephrosis.  Visualized abdominal aorta and IVC are nonaneurysmal.      Impression    IMPRESSION:  Negative abdominal ultrasound.       Discharge Medications   Current Discharge Medication List      START taking these medications    Details   HYDROmorphone (DILAUDID) 2 MG tablet Take 1 tablet (2 mg) by mouth every 6 hours as needed for pain  Qty: 10 tablet, Refills: 0    Associated Diagnoses: Abdominal pain, unspecified abdominal location      ondansetron (ZOFRAN) 4 MG tablet Take 1 tablet (4 mg) by mouth every 8 hours as needed for nausea  Qty:  10 tablet, Refills: 0    Associated Diagnoses: Abdominal pain, unspecified abdominal location      pantoprazole (PROTONIX) 40 MG EC tablet Take 1 tablet (40 mg) by mouth every morning (before breakfast)  Qty: 30 tablet, Refills: 0    Associated Diagnoses: Abdominal pain, unspecified abdominal location         CONTINUE these medications which have NOT CHANGED    Details   acetaminophen (TYLENOL) 325 MG tablet Take 2 tablets (650 mg) by mouth every 4 hours as needed for mild pain or fever  Qty: 100 tablet, Refills: 0    Associated Diagnoses: Hallux valgus (acquired), right foot      ALPRAZolam (XANAX) 0.25 MG tablet Take 0.25 mg by mouth 4 times daily as needed for anxiety      buPROPion (WELLBUTRIN XL) 300 MG 24 hr tablet Take 300 mg by mouth every morning      DULoxetine (CYMBALTA) 60 MG capsule Take 60 mg by mouth daily      modafinil (PROVIGIL) 200 MG tablet Take 400 mg by mouth daily      ondansetron (ZOFRAN-ODT) 4 MG disintegrating tablet Take 1 tablet (4 mg) by mouth every 6 hours as needed for nausea  Qty: 15 tablet, Refills: 0    Associated Diagnoses: Viral gastroenteritis           Allergies   Allergies   Allergen Reactions     Mobic [Meloxicam]      Analgesics-antiinflammatory=night swets

## 2020-01-17 NOTE — ED PROVIDER NOTES
"  History     Chief Complaint:  Abdominal Pain       HPI   Kelsie Liang is a 64 year old female with a history of GERD who presents with abdominal pain. The patient developed aching right upper quadrant abdominal pain with radiation to the back at 1100 this morning. She has associated nausea and vomiting. The patient took zofran prior to arrival without significant improvement. She denies any urinary symptoms, diarrhea, or abdominal distension. She denies any alcohol use.     Allergies:  Mobic [Meloxicam]     Medications:    Xanax   wellbutrin   cymbalta   Atarax   Modafinil     Past Medical History:    Anxiety   Arthritis   Bursitis    Depressive disorder   Gastro-oesophageal reflux disease   Heart murmur    Kidney stone   Sleep apnea     Past Surgical History:    Bunionectomy   Knee surgery   Laparoscopic cholecystectomy   Orthopedic surgery   Osteotomy foot   Shoulder surgery   Tonsillectomy     Family History:    Family history reviewed. No pertinent family history.      Social History:  The patient was accompanied to the ED by significant other.  Smoking Status: Never Smoker  Smokeless Tobacco: Never Used  Alcohol Use: Yes  Drug Use: No  PCP: Iona Gabriel       Review of Systems   Gastrointestinal: Positive for abdominal pain (RUQ), nausea and vomiting. Negative for abdominal distention and diarrhea.   Genitourinary: Negative.    All other systems reviewed and are negative.      Physical Exam     Patient Vitals for the past 24 hrs:   BP Temp Temp src Pulse Resp SpO2 Height Weight   01/16/20 1808 107/74 97.4  F (36.3  C) Temporal 115 18 98 % 1.499 m (4' 11\") 68 kg (150 lb)        Physical Exam  GENERAL: well developed, moaning in pain, looks uncomfortable   HEAD: atraumatic  EYES: pupils reactive, extraocular muscles intact, conjunctivae normal  ENT:  mucus membranes moist  NECK:  trachea midline, normal range of motion  RESPIRATORY: no tachypnea, breath sounds clear to auscultation   CVS: normal " S1/S2, no murmurs, intact distal pulses  ABDOMEN: soft, nondistention, Hypoactive bowel sounds, Upper abdominal pain   MUSCULOSKELETAL: no deformities  SKIN: warm and dry, no acute rashes or ulceration  NEURO: GCS 15, cranial nerves intact, alert and oriented x3  PSYCH:  Mood/affect normal     Emergency Department Course     Imaging:  Radiology findings were communicated with the patient who voiced understanding of the findings.    CT Abdomen Pelvis w Contrast  Preliminary Result  IMPRESSION: No acute process demonstrated within the abdomen and  pelvis.  Reading per radiology     Laboratory:  Laboratory findings were communicated with the patient who voiced understanding of the findings.    ISTAT gases lactate mari POCT: pH: 7.61 (HH), PCO2: 24 (L), PO2: 27, Bicarbonate: 24, O2 Sat: 65, Lactic acid: 2.4 (H)    UA with microscopic: pH urine 8.0 (H), rbc/hpf 5 (H), o/w WNL      CBC:  WBC 14.6 (H), HGB 15.3, , o/w WNL     CMP: Glucose 111 (H), Chloride 110 (H), o/w WNL (Creatinine: 0.70)     Lipase: 213      Troponin(1934):  <0.015      Interventions:  1932 Dilaudid 0.5 mg IV  1940 0.9% sodium chloride BOLUS 1000 mL IV   2139 0.9% sodium chloride BOLUS 1000 mL IV   2139 zofran 4 mg IV   2141 Dilaudid 0.5 mg IV  2159 Ativan 1 mg IV    Emergency Department Course:  Past medical records, nursing notes, and vitals reviewed.    1856 I performed an exam of the patient as documented above.    IV was inserted and blood was drawn for laboratory testing, results above.     The patient provided a urine sample here in the emergency department. This was sent for laboratory testing, findings above.     The patient was sent for a CT abdomen pelvis with contrast while in the emergency department, results above.       2122 Patient rechecked and updated.      2134 I spoke with Dr. Haas of the Hospitalist service from [facility] regarding patient's presentation, findings, and plan of care.       Findings and plan explained to the  Patient who consents to admission. Discussed the patient with Dr. Haas, who will admit the patient to a observation unit bed for further monitoring, evaluation, and treatment.        Impression & Plan       Medical Decision Making:  Kelsie Liang is a 64 year old female who presents to the emergency department today with upper abdominal pain rating to the right.  She has had prior cholecystectomy.  She notes severe pain with intense nausea.  Exam looks quite uncomfortable with hypoactive bowel sounds.  CT is unrevealing.  She was given IV fluids and pain control.  She is worried about going home in her current state.  Spoke with Dr. Fuller regarding admission.  CT was done with contrast no evidence of occlusion or ischemic bowel but lactic acid was slightly elevated.  Will get a repeat lactic acid.  Patient seen Dr. Burrell in the past and is had some sphincter of Oddi issues.  Patient may need repeat GI consult.      Discharge Diagnosis:    ICD-10-CM    1. Abdominal pain, epigastric R10.13 Troponin I     Troponin I     CANCELED: Troponin I   2. Nausea R11.0        Disposition:  The patient is admitted into the care of Dr. Haas.     Scribe Disclosure:  Luke MCCABE, am serving as a scribe at 6:56 PM on 1/16/2020 to document services personally performed by Robin Elena MD based on my observations and the provider's statements to me.      1/16/2020   Robin Elena MD Adams, Shaun L, MD  01/16/20 [id number]

## 2020-01-17 NOTE — PHARMACY-ADMISSION MEDICATION HISTORY
Admission medication history interview status for the 1/16/2020  admission is complete. See EPIC admission navigator for prior to admission medications     Medication history source reliability:Good    Actions taken by pharmacist (provider contacted, etc):Interviewed pt, reviewed care everywhere and sure scripts and called [facility] to verify dose of Bupropion     Additional medication history information not noted on PTA med list :None    Medication reconciliation/reorder completed by provider prior to medication history? No    Time spent in this activity: 20 minutes    Prior to Admission medications    Medication Sig Last Dose Taking? Auth Provider   acetaminophen (TYLENOL) 325 MG tablet Take 2 tablets (650 mg) by mouth every 4 hours as needed for mild pain or fever PRN Yes Jose Massey MD   ALPRAZolam (XANAX) 0.25 MG tablet Take 0.25 mg by mouth 4 times daily as needed for anxiety PRN Yes Unknown, Entered By History   buPROPion (WELLBUTRIN XL) 300 MG 24 hr tablet Take 300 mg by mouth every morning 1/16/2020 at Unknown time Yes Unknown, Entered By History   DULoxetine (CYMBALTA) 60 MG capsule Take 60 mg by mouth daily 1/16/2020 at AM Yes Unknown, Entered By History   modafinil (PROVIGIL) 200 MG tablet Take 400 mg by mouth daily 1/16/2020 at AM Yes Unknown, Entered By History   ondansetron (ZOFRAN-ODT) 4 MG disintegrating tablet Take 1 tablet (4 mg) by mouth every 6 hours as needed for nausea PRN Yes Khloe Arreguin PA-C

## 2020-01-17 NOTE — PLAN OF CARE
Observation goals PRIOR TO DISCHARGE       Comments:     -Diagnostic tests and consults completed and resulted- Not met, ultrasound, EKG, GI consult pending    -Vital signs normal or at patient baseline- Partially met, tachy     -Tolerating oral intake to maintain hydration- Partially met, pt NPO    -Adequate pain control on oral analgesics- Met    Nurse to notify provider when observation goals have been met and patient is ready for discharge.        Came in early morning with abdominal pain and nausea. Assessed and pt stated there was no pain and nausea had been controlled with Zofran in the ED. Right upper abdomen is tender when palpated. Protonix given and LR is running at 125 mL/hr. Lactic acid was 2.4 and fluids were given and when re-drawn, it was 1.1. Is NPO for abdominal ultrasound in the am. EKG and GI scheduled this morning.

## 2020-01-17 NOTE — PLAN OF CARE
Observation goals PRIOR TO DISCHARGE       Comments:     -Diagnostic tests and consults completed and resulted- Not met, ultrasound, EKG, GI consult pending    -Vital signs normal or at patient baseline- Partially met, tachy     -Tolerating oral intake to maintain hydration- Partially met, pt NPO    -Adequate pain control on oral analgesics- Met    Nurse to notify provider when observation goals have been met and patient is ready for discharge.

## 2020-01-17 NOTE — PROGRESS NOTES
Observation goals PRIOR TO DISCHARGE        Comments:      -Diagnostic tests and consults completed and resulted- Partially met.  Ultrasound in progress  EKG completed.  GI consult pending.    -Vital signs normal or at patient baseline- Met at this time.     -Tolerating oral intake to maintain hydration- Partially met. pt NPO    -Adequate pain control on oral analgesics- Met.

## 2020-01-17 NOTE — ED TRIAGE NOTES
R sided abdominal pain started earlier today after eating out for breakfast. Was visiting mother in hospice and norovirus was going around. Having nausea and abd pain. No diarrhea.

## 2020-01-17 NOTE — H&P
[facility]    History and Physical  Hospitalist       Date of Admission:  1/16/2020  Date of Service (when I saw the patient): 01/16/20    ASSESSMENT  Kelsie Liang is a very pleasant 64 year old woman with past history that is most significant for prior cholecystectomy, Sphincter of Oddi dysfunction, ARSENIO on CPAP, GERD, nephrolithiasis, and chronic depression and anxiety who presents with abdominal pain of as yet unclear etiology.    PLAN    1) Abdominal pain causing SIRS: Cause unclear; she has tachycardia, leukocytosis and elevated Lactate; without fever, hypotension. CT negative for acute pathology such as enteritis or obstruction or perforation, or nephrolithiasis; hepatic panel and Lipase in normal range. She could have recurrent Sphincter of Oddi dysfunction. Choledocholithiasis seems less likely at present. Mesenteric ischemia considered but her pain is not markedly out of proportion to the exam at present; she is non-tender to palpation in both lower quadrants and mildly tender in epigastrium/RUQ. She could have PUD though there are no clear signs of GI bleed at present. Troponin negative making ACS less likely.    -- Observation. NPO. Dr. Burrell of GI consulted. Tylenol, Oxycodone, IV Dilaudid as needed for pain. Anti-emetics as needed.  ml/hour IV fluid. Empiric IV PPI ordered. Hold off on antibiotics in the absence of clear signs of infection.     -- Abdominal US ordered. Check repeat lactate to make sure it is trending downward with IV fluid administration. Check one more Troponin and EKG to rule out ACS.    2) Acute respiratory alkalosis: Likely due to severe pain and anxiety. Monitor closely under observation in the hospital.     3) ARSENIO: Continue CPAP    4) Chronic depression and anxiety: On Xanax, Cymbalta, Wellbutrin and Provigil. PRN IV Ativan ordered for nausea and anxiety while she is here (will use with caution, at low doses 0.5-1 mg, in the setting of  "concomitant narcotic administration). Resume home medications at discharge.    5) Chronic benign hematuria: reportedly with negative prior cystoscopy. Noted.     Chief Complaint   Abdominal pain    History is obtained from the patient, her  at the bedside, and the ED physician whom I have spoken with    History of Present Illness   Kelsie Liang is a very pleasant 64 year old woman who presents with abdominal pain; this was sudden, acute onset this morning around 11 AM soon after eating some oatmeal. It is sharp and severe, predominantly epigastric and radiating into the right upper and lower side and the back; constant since onset, surging intermittently to be become very severe and then subsiding but not resolving; it remains present now. It is associated with nausea (no emesis). She had a normal, non-bloody, non-watery bowel movement about half an hour after the pain started which did not change it. She has passed a little gas today which also did not change it. She adds that the pain feels similar to when she had to have her gall bladder removed several years ago. Since then she has had intermittent episodes of recurrent pain and at one point she says she saw Dr. Burrell of GI and was diagnosed with Sphincter of Oddi dysfunction. Over the past couple of years she has gotten intermittent abdominal that is much milder, about once every couple of months, and will resolve with Zofran. Now, she has gotten some Dilaudid in the ED with partial relief of symptoms. She denies any other acute complaints.    In the ED, Blood pressure 107/74, pulse 115, temperature 97.4  F (36.3  C), temperature source Temporal, resp. rate 18, height 1.499 m (4' 11\"), weight 68 kg (150 lb), SpO2 98 %, not currently breastfeeding.    CBC notable for WBC 15. Lactate 2.4. VBG showed 7.61/24. CMP was done and notable for Cl 110, otherwise was in normal reference range, as was Lipase at 213. Troponin negative. UA showed WBC 3, " "RBC 5. CT abdomen and pelvis showed no acute pathology. She was given IV fluid, Dilaudid, Zofran, and 1 mg IV Ativan.    PHYSICAL EXAM  Blood pressure 107/74, pulse 115, temperature 97.4  F (36.3  C), temperature source Temporal, resp. rate 18, height 1.499 m (4' 11\"), weight 68 kg (150 lb), SpO2 98 %, not currently breastfeeding.  Constitutional: Alert and oriented to person, place and time; no apparent distress  HEENT: normocephalic moist mucus membranes  Respiratory: lungs clear to auscultation bilaterally  Cardiovascular: regular S1 S2   GI: abdomen soft mildly tender in epigastrium and RUQ, non distended bowel sounds positive but very faint  Lymph/Hematologic: no pallor, no cervical lymphadenopathy  Skin: no rash, good turgor  Musculoskeletal: no clubbing, cyanosis or edema  Neurologic: extra-ocular muscles intact; moves all four extremities  Psychiatric: appropriate affect, insight and judgment     DVT Prophylaxis: Pneumatic Compression Devices  Code Status: Full Code    Disposition: Expected discharge in 0-2 days    Chad Haas MD    Past Medical History    I have reviewed this patient's medical history and updated it with pertinent information if needed.   Past Medical History:   Diagnosis Date     Anxiety      Arthritis      Bursitis     bilat knees     Concussion 1/28/2016    FELL ON THE ICE     CPAP (continuous positive airway pressure) dependence      Depressive disorder      Gastro-oesophageal reflux disease      Heart murmur      Hematuria      Kidney stone      Sleep apnea     CPAP       Past Surgical History   I have reviewed this patient's surgical history and updated it with pertinent information if needed.  Past Surgical History:   Procedure Laterality Date     BUNIONECTOMY VIRI, REPAIR HAMMER TOE(S), COMBINED Right 12/9/2016    Procedure: COMBINED BUNIONECTOMY LALITHA, REPAIR HAMMER TOE(S);  Surgeon: Jose Massey MD;  Location: [facility] OR     KNEE SURGERY       LAPAROSCOPIC " CHOLECYSTECTOMY  12/3/2013    Procedure: LAPAROSCOPIC CHOLECYSTECTOMY;  LAPAROSCOPIC CHOLECYSTECTOMY ;  Surgeon: Dian Landeros MD;  Location: [facility]     ORTHOPEDIC SURGERY  2009,2010     OSTEOTOMY FOOT Right 12/9/2016    Procedure: OSTEOTOMY FOOT;  Surgeon: Jose Massey MD;  Location:  OR     SHOULDER SURGERY       TONSILLECTOMY         Prior to Admission Medications   Prior to Admission Medications   Prescriptions Last Dose Informant Patient Reported? Taking?   ALPRAZolam (XANAX) 0.25 MG tablet PRN  Yes Yes   Sig: Take 0.25 mg by mouth 4 times daily as needed for anxiety   DULoxetine (CYMBALTA) 60 MG capsule 1/16/2020 at AM  Yes Yes   Sig: Take 60 mg by mouth daily   acetaminophen (TYLENOL) 325 MG tablet PRN Self No Yes   Sig: Take 2 tablets (650 mg) by mouth every 4 hours as needed for mild pain or fever   buPROPion (WELLBUTRIN XL) 300 MG 24 hr tablet 1/16/2020 at Unknown time  Yes Yes   Sig: Take 300 mg by mouth every morning   modafinil (PROVIGIL) 200 MG tablet 1/16/2020 at AM  Yes Yes   Sig: Take 400 mg by mouth daily   ondansetron (ZOFRAN-ODT) 4 MG disintegrating tablet PRN Self No Yes   Sig: Take 1 tablet (4 mg) by mouth every 6 hours as needed for nausea      Facility-Administered Medications: None     Allergies   Allergies   Allergen Reactions     Mobic [Meloxicam]      Analgesics-antiinflammatory=night swets       Social History   I have reviewed this patient's social history and updated it with pertinent information if needed. Kelsie Liang  reports that she has never smoked. She has never used smokeless tobacco. She reports current alcohol use. She reports that she does not use drugs.    Family History   Family history assessed and, except as above, is non-contributory.    Review of Systems   The 10 point Review of Systems is negative other than noted in the HPI or here.     Primary Care Physician   Iona Gabriel    Data   Labs Ordered and Resulted from Time of ED Arrival Up to  the Time of Departure from the ED   CBC WITH PLATELETS DIFFERENTIAL - Abnormal; Notable for the following components:       Result Value    WBC 14.6 (*)     Absolute Neutrophil 13.7 (*)     Absolute Lymphocytes 0.5 (*)     All other components within normal limits   COMPREHENSIVE METABOLIC PANEL - Abnormal; Notable for the following components:    Chloride 110 (*)     Glucose 111 (*)     All other components within normal limits   ROUTINE UA WITH MICROSCOPIC - Abnormal; Notable for the following components:    pH Urine 8.0 (*)     RBC Urine 5 (*)     All other components within normal limits   ISTAT  GASES LACTATE EDWAR POCT - Abnormal; Notable for the following components:    Ph Venous 7.61 (*)     PCO2 Venous 24 (*)     Lactic Acid 2.4 (*)     All other components within normal limits   LIPASE   TROPONIN I   PERIPHERAL IV CATHETER   FREE WATER       Data reviewed today:  I personally reviewed the abdominal CT image(s) showing no acute pathology.    Recent Results (from the past 24 hour(s))   CT Abdomen Pelvis w Contrast    Narrative    CT ABDOMEN AND PELVIS WITH CONTRAST 1/16/2020 8:42 PM     HISTORY: Abdominal distension.    COMPARISON: September 3, 2017    TECHNIQUE: Volumetric helical acquisition of CT images from the lung  bases through the symphysis pubis after the administration of 75mL  Isovue-370 intravenous contrast. Radiation dose for this scan was  reduced using automated exposure control, adjustment of the mA and/or  kV according to patient size, or iterative reconstruction technique.    FINDINGS: The liver, bilateral kidneys and adrenal glands, pancreas,  and spleen demonstrate no worrisome focal lesion. No hydronephrosis.  Cholecystectomy change. Normal caliber aorta. No diverticulitis.  Appendix not seen. There is no free fluid in the abdomen or pelvis. No  free air in the abdomen. Bone windows reveal no destructive lesions.  There are no abdominal or pelvic lymph nodes that are abnormal by  size  criteria. The visualized lung bases are unremarkable. There are no  dilated loops of small bowel or colon.      Impression    IMPRESSION: No acute process demonstrated within the abdomen and  pelvis.    MANDO CALLAWAY MD

## 2020-01-17 NOTE — PLAN OF CARE
RN:  Observation goals met.  Patient A/Ox4. VSS on RA.  Tolerating clears with no nausea.  Ultrasound of abdomen completed with negativefindings.   GI consult completed.  Patient up IND. She is to follow-up with her primary MD in one week--patient to make her appointment.  Discharging with all of her belongings.  Sending two filled scripts with the patient.  Patient to fill her dilaudid script at her pharmacy. Patient's  to transport her home.

## 2020-01-22 LAB — INTERPRETATION ECG - MUSE: NORMAL

## 2022-02-12 ENCOUNTER — APPOINTMENT (OUTPATIENT)
Dept: CT IMAGING | Facility: CLINIC | Age: 67
End: 2022-02-12
Payer: MEDICARE

## 2022-02-12 ENCOUNTER — HOSPITAL ENCOUNTER (OUTPATIENT)
Facility: CLINIC | Age: 67
Setting detail: OBSERVATION
Discharge: HOME | End: 2022-02-13
Attending: NURSE PRACTITIONER | Admitting: INTERNAL MEDICINE
Payer: MEDICARE

## 2022-02-12 DIAGNOSIS — R11.2 INTRACTABLE NAUSEA AND VOMITING: ICD-10-CM

## 2022-02-12 DIAGNOSIS — R10.84 ABDOMINAL PAIN, GENERALIZED: Primary | ICD-10-CM

## 2022-02-12 DIAGNOSIS — E27.9 ADRENAL NODULE (H): ICD-10-CM

## 2022-02-12 DIAGNOSIS — R10.9 ABDOMINAL PAIN, UNSPECIFIED ABDOMINAL LOCATION: ICD-10-CM

## 2022-02-12 DIAGNOSIS — R00.0 SINUS TACHYCARDIA: ICD-10-CM

## 2022-02-12 LAB
ALBUMIN SERPL-MCNC: 4 G/DL (ref 3.4–5)
ALBUMIN UR-MCNC: NEGATIVE MG/DL
ALP SERPL-CCNC: 126 U/L (ref 40–150)
ALT SERPL W P-5'-P-CCNC: 25 U/L (ref 0–50)
ANION GAP SERPL CALCULATED.3IONS-SCNC: 8 MMOL/L (ref 3–14)
APPEARANCE UR: CLEAR
AST SERPL W P-5'-P-CCNC: 16 U/L (ref 0–45)
ATRIAL RATE - MUSE: 100 BPM
BASOPHILS # BLD AUTO: 0 10E3/UL (ref 0–0.2)
BASOPHILS NFR BLD AUTO: 0 %
BILIRUB SERPL-MCNC: 0.4 MG/DL (ref 0.2–1.3)
BILIRUB UR QL STRIP: NEGATIVE
BUN SERPL-MCNC: 15 MG/DL (ref 7–30)
CALCIUM SERPL-MCNC: 9.6 MG/DL (ref 8.5–10.1)
CHLORIDE BLD-SCNC: 105 MMOL/L (ref 94–109)
CO2 SERPL-SCNC: 23 MMOL/L (ref 20–32)
COLOR UR AUTO: ABNORMAL
CREAT BLD-MCNC: 0.7 MG/DL (ref 0.5–1)
CREAT SERPL-MCNC: 0.73 MG/DL (ref 0.52–1.04)
DIASTOLIC BLOOD PRESSURE - MUSE: NORMAL MMHG
EOSINOPHIL # BLD AUTO: 0.2 10E3/UL (ref 0–0.7)
EOSINOPHIL NFR BLD AUTO: 1 %
ERYTHROCYTE [DISTWIDTH] IN BLOOD BY AUTOMATED COUNT: 12.9 % (ref 10–15)
GFR SERPL CREATININE-BSD FRML MDRD: 90 ML/MIN/1.73M2
GFR SERPL CREATININE-BSD FRML MDRD: >60 ML/MIN/1.73M2
GLUCOSE BLD-MCNC: 123 MG/DL (ref 70–99)
GLUCOSE BLDC GLUCOMTR-MCNC: 102 MG/DL (ref 70–99)
GLUCOSE UR STRIP-MCNC: NEGATIVE MG/DL
HCT VFR BLD AUTO: 44.5 % (ref 35–47)
HGB BLD-MCNC: 15.1 G/DL (ref 11.7–15.7)
HGB UR QL STRIP: ABNORMAL
IMM GRANULOCYTES # BLD: 0.1 10E3/UL
IMM GRANULOCYTES NFR BLD: 0 %
INTERPRETATION ECG - MUSE: NORMAL
KETONES UR STRIP-MCNC: 10 MG/DL
LACTATE SERPL-SCNC: 2 MMOL/L (ref 0.7–2)
LEUKOCYTE ESTERASE UR QL STRIP: NEGATIVE
LIPASE SERPL-CCNC: 154 U/L (ref 73–393)
LYMPHOCYTES # BLD AUTO: 0.6 10E3/UL (ref 0.8–5.3)
LYMPHOCYTES NFR BLD AUTO: 5 %
MCH RBC QN AUTO: 31.3 PG (ref 26.5–33)
MCHC RBC AUTO-ENTMCNC: 33.9 G/DL (ref 31.5–36.5)
MCV RBC AUTO: 92 FL (ref 78–100)
MONOCYTES # BLD AUTO: 0.4 10E3/UL (ref 0–1.3)
MONOCYTES NFR BLD AUTO: 3 %
NEUTROPHILS # BLD AUTO: 11.4 10E3/UL (ref 1.6–8.3)
NEUTROPHILS NFR BLD AUTO: 91 %
NITRATE UR QL: NEGATIVE
NRBC # BLD AUTO: 0 10E3/UL
NRBC BLD AUTO-RTO: 0 /100
P AXIS - MUSE: 51 DEGREES
PH UR STRIP: 7.5 [PH] (ref 5–7)
PLATELET # BLD AUTO: 347 10E3/UL (ref 150–450)
POTASSIUM BLD-SCNC: 3.7 MMOL/L (ref 3.4–5.3)
PR INTERVAL - MUSE: 164 MS
PROT SERPL-MCNC: 7.6 G/DL (ref 6.8–8.8)
QRS DURATION - MUSE: 82 MS
QT - MUSE: 360 MS
QTC - MUSE: 464 MS
R AXIS - MUSE: 69 DEGREES
RBC # BLD AUTO: 4.82 10E6/UL (ref 3.8–5.2)
RBC URINE: 7 /HPF
SARS-COV-2 RNA RESP QL NAA+PROBE: NEGATIVE
SODIUM SERPL-SCNC: 136 MMOL/L (ref 133–144)
SP GR UR STRIP: 1 (ref 1–1.03)
SQUAMOUS EPITHELIAL: 1 /HPF
SYSTOLIC BLOOD PRESSURE - MUSE: NORMAL MMHG
T AXIS - MUSE: 50 DEGREES
TROPONIN I SERPL HS-MCNC: <3 NG/L
UROBILINOGEN UR STRIP-MCNC: NORMAL MG/DL
VENTRICULAR RATE- MUSE: 100 BPM
WBC # BLD AUTO: 12.6 10E3/UL (ref 4–11)
WBC URINE: 3 /HPF

## 2022-02-12 PROCEDURE — 96375 TX/PRO/DX INJ NEW DRUG ADDON: CPT | Mod: 59

## 2022-02-12 PROCEDURE — 36415 COLL VENOUS BLD VENIPUNCTURE: CPT

## 2022-02-12 PROCEDURE — 93005 ELECTROCARDIOGRAM TRACING: CPT

## 2022-02-12 PROCEDURE — 96376 TX/PRO/DX INJ SAME DRUG ADON: CPT

## 2022-02-12 PROCEDURE — 99285 EMERGENCY DEPT VISIT HI MDM: CPT | Mod: 25

## 2022-02-12 PROCEDURE — G0378 HOSPITAL OBSERVATION PER HR: HCPCS

## 2022-02-12 PROCEDURE — 84155 ASSAY OF PROTEIN SERUM: CPT

## 2022-02-12 PROCEDURE — 258N000003 HC RX IP 258 OP 636: Performed by: HOSPITALIST

## 2022-02-12 PROCEDURE — 82565 ASSAY OF CREATININE: CPT

## 2022-02-12 PROCEDURE — 96374 THER/PROPH/DIAG INJ IV PUSH: CPT | Mod: 59

## 2022-02-12 PROCEDURE — 96376 TX/PRO/DX INJ SAME DRUG ADON: CPT | Mod: 59

## 2022-02-12 PROCEDURE — 82962 GLUCOSE BLOOD TEST: CPT

## 2022-02-12 PROCEDURE — 250N000011 HC RX IP 250 OP 636: Performed by: NURSE PRACTITIONER

## 2022-02-12 PROCEDURE — 84484 ASSAY OF TROPONIN QUANT: CPT

## 2022-02-12 PROCEDURE — 83605 ASSAY OF LACTIC ACID: CPT

## 2022-02-12 PROCEDURE — 250N000011 HC RX IP 250 OP 636: Performed by: HOSPITALIST

## 2022-02-12 PROCEDURE — 83690 ASSAY OF LIPASE: CPT

## 2022-02-12 PROCEDURE — C9803 HOPD COVID-19 SPEC COLLECT: HCPCS

## 2022-02-12 PROCEDURE — 85025 COMPLETE CBC W/AUTO DIFF WBC: CPT

## 2022-02-12 PROCEDURE — 250N000009 HC RX 250: Performed by: NURSE PRACTITIONER

## 2022-02-12 PROCEDURE — 81001 URINALYSIS AUTO W/SCOPE: CPT

## 2022-02-12 PROCEDURE — 96361 HYDRATE IV INFUSION ADD-ON: CPT

## 2022-02-12 PROCEDURE — 99220 PR INITIAL OBSERVATION CARE,LEVEL III: CPT | Performed by: HOSPITALIST

## 2022-02-12 PROCEDURE — 250N000011 HC RX IP 250 OP 636

## 2022-02-12 PROCEDURE — 258N000003 HC RX IP 258 OP 636

## 2022-02-12 PROCEDURE — 74177 CT ABD & PELVIS W/CONTRAST: CPT

## 2022-02-12 PROCEDURE — 96375 TX/PRO/DX INJ NEW DRUG ADDON: CPT

## 2022-02-12 PROCEDURE — 87635 SARS-COV-2 COVID-19 AMP PRB: CPT

## 2022-02-12 RX ORDER — IOPAMIDOL 755 MG/ML
80 INJECTION, SOLUTION INTRAVASCULAR ONCE
Status: COMPLETED | OUTPATIENT
Start: 2022-02-12 | End: 2022-02-12

## 2022-02-12 RX ORDER — HYDROCODONE BITARTRATE AND ACETAMINOPHEN 5; 325 MG/1; MG/1
1 TABLET ORAL EVERY 6 HOURS PRN
Qty: 10 TABLET | Refills: 0 | Status: SHIPPED | OUTPATIENT
Start: 2022-02-12 | End: 2022-02-12

## 2022-02-12 RX ORDER — ONDANSETRON 2 MG/ML
4 INJECTION INTRAMUSCULAR; INTRAVENOUS EVERY 30 MIN PRN
Status: DISCONTINUED | OUTPATIENT
Start: 2022-02-12 | End: 2022-02-12

## 2022-02-12 RX ORDER — ONDANSETRON 2 MG/ML
4 INJECTION INTRAMUSCULAR; INTRAVENOUS EVERY 6 HOURS PRN
Status: DISCONTINUED | OUTPATIENT
Start: 2022-02-12 | End: 2022-02-13 | Stop reason: HOSPADM

## 2022-02-12 RX ORDER — NALOXONE HYDROCHLORIDE 0.4 MG/ML
0.4 INJECTION, SOLUTION INTRAMUSCULAR; INTRAVENOUS; SUBCUTANEOUS
Status: DISCONTINUED | OUTPATIENT
Start: 2022-02-12 | End: 2022-02-13 | Stop reason: HOSPADM

## 2022-02-12 RX ORDER — ONDANSETRON 4 MG/1
4 TABLET, ORALLY DISINTEGRATING ORAL EVERY 6 HOURS PRN
Status: DISCONTINUED | OUTPATIENT
Start: 2022-02-12 | End: 2022-02-13 | Stop reason: HOSPADM

## 2022-02-12 RX ORDER — HYDROMORPHONE HCL IN WATER/PF 6 MG/30 ML
.1-.2 PATIENT CONTROLLED ANALGESIA SYRINGE INTRAVENOUS
Status: DISCONTINUED | OUTPATIENT
Start: 2022-02-12 | End: 2022-02-13 | Stop reason: HOSPADM

## 2022-02-12 RX ORDER — PROCHLORPERAZINE 25 MG
12.5 SUPPOSITORY, RECTAL RECTAL EVERY 12 HOURS PRN
Status: DISCONTINUED | OUTPATIENT
Start: 2022-02-12 | End: 2022-02-13 | Stop reason: HOSPADM

## 2022-02-12 RX ORDER — ONDANSETRON 4 MG/1
4 TABLET, ORALLY DISINTEGRATING ORAL EVERY 8 HOURS PRN
Qty: 10 TABLET | Refills: 0 | Status: SHIPPED | OUTPATIENT
Start: 2022-02-12 | End: 2022-02-12

## 2022-02-12 RX ORDER — HYDROMORPHONE HYDROCHLORIDE 1 MG/ML
0.5 INJECTION, SOLUTION INTRAMUSCULAR; INTRAVENOUS; SUBCUTANEOUS
Status: COMPLETED | OUTPATIENT
Start: 2022-02-12 | End: 2022-02-12

## 2022-02-12 RX ORDER — NALOXONE HYDROCHLORIDE 0.4 MG/ML
0.2 INJECTION, SOLUTION INTRAMUSCULAR; INTRAVENOUS; SUBCUTANEOUS
Status: DISCONTINUED | OUTPATIENT
Start: 2022-02-12 | End: 2022-02-13 | Stop reason: HOSPADM

## 2022-02-12 RX ORDER — PROCHLORPERAZINE MALEATE 5 MG
5 TABLET ORAL EVERY 6 HOURS PRN
Status: DISCONTINUED | OUTPATIENT
Start: 2022-02-12 | End: 2022-02-13 | Stop reason: HOSPADM

## 2022-02-12 RX ORDER — SODIUM CHLORIDE 9 MG/ML
INJECTION, SOLUTION INTRAVENOUS CONTINUOUS
Status: DISCONTINUED | OUTPATIENT
Start: 2022-02-12 | End: 2022-02-13

## 2022-02-12 RX ADMIN — HYDROMORPHONE HYDROCHLORIDE 0.5 MG: 1 INJECTION, SOLUTION INTRAMUSCULAR; INTRAVENOUS; SUBCUTANEOUS at 15:39

## 2022-02-12 RX ADMIN — IOPAMIDOL 80 ML: 755 INJECTION, SOLUTION INTRAVENOUS at 16:00

## 2022-02-12 RX ADMIN — ONDANSETRON 4 MG: 2 INJECTION INTRAMUSCULAR; INTRAVENOUS at 17:32

## 2022-02-12 RX ADMIN — ONDANSETRON 4 MG: 2 INJECTION INTRAMUSCULAR; INTRAVENOUS at 15:37

## 2022-02-12 RX ADMIN — PROCHLORPERAZINE EDISYLATE 5 MG: 5 INJECTION INTRAMUSCULAR; INTRAVENOUS at 21:59

## 2022-02-12 RX ADMIN — SODIUM CHLORIDE 1000 ML: 9 INJECTION, SOLUTION INTRAVENOUS at 19:35

## 2022-02-12 RX ADMIN — HYDROMORPHONE HYDROCHLORIDE 0.2 MG: 0.2 INJECTION, SOLUTION INTRAMUSCULAR; INTRAVENOUS; SUBCUTANEOUS at 22:12

## 2022-02-12 RX ADMIN — SODIUM CHLORIDE 80 ML: 900 INJECTION INTRAVENOUS at 16:00

## 2022-02-12 RX ADMIN — SODIUM CHLORIDE: 9 INJECTION, SOLUTION INTRAVENOUS at 21:31

## 2022-02-12 ASSESSMENT — ENCOUNTER SYMPTOMS
COUGH: 0
FEVER: 0
CONSTIPATION: 0
ABDOMINAL PAIN: 1
NAUSEA: 1
DYSURIA: 0
FREQUENCY: 0
BACK PAIN: 1
BLOOD IN STOOL: 0
LIGHT-HEADEDNESS: 0
HEMATURIA: 0
SHORTNESS OF BREATH: 0
HEADACHES: 0
VOMITING: 1
FLANK PAIN: 0
DIARRHEA: 0

## 2022-02-12 ASSESSMENT — MIFFLIN-ST. JEOR: SCORE: 1101.53

## 2022-02-12 NOTE — ED PROVIDER NOTES
History   Chief Complaint:  Abdominal Pain       HPI   Kelsie Liang is a 66 year old female with history of cholecystitis status post cholecystectomy, sphincter of Oddi I dysfunction, obstructive sleep apnea on CPAP, GERD, nephrolithiasis who presents with sudden onset abdominal pain.  The patient states this morning around 11 AM she developed pain across her entire upper abdomen radiating to the back.  She states this feels similar to gallbladder issues she has had in the past.  She has a history of kidney stones, but this feels quite different.  She states the pain came on out of the blue and rates it a 10 out of 10 here.  She denies any chest pain or shortness of breath.  She has been nauseous since the pain came on and vomited in the room for the first time.  She vomited so hard, she lost control of her urine.  Kelsie states she had a normal bowel movement earlier this morning and noticed no blood in the stool.  She denies any urinary symptoms such as hematuria, dysuria, frequency.  She states she only drinks alcohol socially and had about a half of a glass of wine last night.  She has not been running any fevers, has no recent travel, or no known exposure to illness.  She has no vaginal bleeding or discharge.  She asks for something to help her with the nausea and her pain level.  On chart review, the patient had a similar pain episode about 1 year ago and was hospitalized January 16 through January 17, 2021.  Her symptoms had resolved and no etiology was identified on abdominal ultrasound or any acute process on CT.    Review of Systems   Constitutional: Negative for fever.   HENT: Negative for congestion.    Respiratory: Negative for cough and shortness of breath.    Cardiovascular: Negative for chest pain and leg swelling.   Gastrointestinal: Positive for abdominal pain, nausea and vomiting. Negative for blood in stool, constipation and diarrhea.   Genitourinary: Negative for dysuria, flank  pain, frequency, hematuria, vaginal bleeding and vaginal discharge.   Musculoskeletal: Positive for back pain.   Neurological: Negative for light-headedness and headaches.   All other systems reviewed and are negative.    Allergies:  Mobic [Meloxicam]    Medications:  acetaminophen (TYLENOL) 325 MG tablet  ALPRAZolam (XANAX) 0.25 MG tablet  buPROPion (WELLBUTRIN XL) 300 MG 24 hr tablet  DULoxetine (CYMBALTA) 60 MG capsule  modafinil (PROVIGIL) 200 MG tablet  ondansetron (ZOFRAN-ODT) 4 MG disintegrating tablet  pantoprazole (PROTONIX) 40 MG EC tablet      Past Medical History:     Past Medical History:   Diagnosis Date     Anxiety      Arthritis      Bursitis      Concussion 1/28/2016     CPAP (continuous positive airway pressure) dependence      Depressive disorder      Gastro-oesophageal reflux disease      Heart murmur      Hematuria      Kidney stone      Sleep apnea          Past Surgical History:    Past Surgical History:   Procedure Laterality Date     BUNIONECTOMY VIRI, REPAIR HAMMER TOE(S), COMBINED Right 12/9/2016    Procedure: COMBINED BUNIONECTOMY LALITHA, REPAIR HAMMER TOE(S);  Surgeon: Jose Massey MD;  Location:  OR     KNEE SURGERY       LAPAROSCOPIC CHOLECYSTECTOMY  12/3/2013    Procedure: LAPAROSCOPIC CHOLECYSTECTOMY;  LAPAROSCOPIC CHOLECYSTECTOMY ;  Surgeon: Dian Landeros MD;  Location: [facility]     ORTHOPEDIC SURGERY  2009,2010     OSTEOTOMY FOOT Right 12/9/2016    Procedure: OSTEOTOMY FOOT;  Surgeon: Jose Massey MD;  Location:  OR     SHOULDER SURGERY       TONSILLECTOMY        Family History:    No family history on file.    Social History:    Social ETOH Use  Non smoker  Is a musher of sled dogs    Physical Exam     Patient Vitals for the past 24 hrs:   BP Temp Temp src Pulse Resp SpO2 Weight   02/12/22 1900 -- -- -- 114 13 -- --   02/12/22 1800 -- -- -- 112 (!) 33 -- --   02/12/22 1720 129/85 -- -- 101 24 -- --   02/12/22 1715 129/85 97.2  F (36.2  C) Oral 103  25 -- --   02/12/22 1615 115/78 -- -- 63 -- 97 % --   02/12/22 1555 -- -- -- 98 20 95 % --   02/12/22 1503 124/81 -- -- 118 28 98 % 63.5 kg (140 lb)     Physical Exam  General: Adult female sitting on wheelchair in hospital room vomiting into a bag. Appears uncomfortable.  HENT: Patient wearing face mask. When taken off, mucous membranes appear moist.  Eyes: Conjunctive and sclera clear.  CV: Regular rate and rhythm. Normal S1, S2. No appreciable murmurs, gallops or rubs.  Resp: Lungs clear to auscultation bilaterally. Normal respiratory effort. Speaks in full sentences. No stridor or cough observed.  GI: Scar from previous cholecystectomy. Abdomen soft, non distended. Diffusely tender to palpation across LUQ and RUQ. No rebound or guarding.  MSK: Moves all extremities without difficulty. No lower extremity edema.  Skin: Warm and dry.  Neuro: Awake, alert, oriented x 3. Cranial nerves grossly intact.  Psych: Tearful. Cooperative. Normal affect.      Emergency Department Course   ECG  ECG obtained at 1546, ECG read at 1552  Normal Sinus Rhythm    Possible left atrial enlargement.  Rate 100 bpm. SC interval 164 ms. QRS duration 82 ms. QT/QTc 360/464 ms. P-R-T axes 51 69 50      Imaging:  CT Aortic Survey w Contrast   Final Result   IMPRESSION:   1.  Moderate amount of stool throughout the colon, increased since the previous exam.   2.  No other cause for abdominal pain is identified.   3.  Indeterminate 1.3 cm left adrenal nodule, unchanged. Consider adrenal nodule protocol CT to attempt more definitive characterization.           Report per radiology    Laboratory:  Labs Ordered and Resulted from Time of ED Arrival to Time of ED Departure   COMPREHENSIVE METABOLIC PANEL - Abnormal       Result Value    Sodium 136      Potassium 3.7      Chloride 105      Carbon Dioxide (CO2) 23      Anion Gap 8      Urea Nitrogen 15      Creatinine 0.73      Calcium 9.6      Glucose 123 (*)     Alkaline Phosphatase 126      AST 16       ALT 25      Protein Total 7.6      Albumin 4.0      Bilirubin Total 0.4      GFR Estimate 90     ROUTINE UA WITH MICROSCOPIC REFLEX TO CULTURE - Abnormal    Color Urine Light Yellow      Appearance Urine Clear      Glucose Urine Negative      Bilirubin Urine Negative      Ketones Urine 10  (*)     Specific Gravity Urine 1.005      Blood Urine Small (*)     pH Urine 7.5 (*)     Protein Albumin Urine Negative      Urobilinogen Urine Normal      Nitrite Urine Negative      Leukocyte Esterase Urine Negative      RBC Urine 7 (*)     WBC Urine 3      Squamous Epithelials Urine 1     CBC WITH PLATELETS AND DIFFERENTIAL - Abnormal    WBC Count 12.6 (*)     RBC Count 4.82      Hemoglobin 15.1      Hematocrit 44.5      MCV 92      MCH 31.3      MCHC 33.9      RDW 12.9      Platelet Count 347      % Neutrophils 91      % Lymphocytes 5      % Monocytes 3      % Eosinophils 1      % Basophils 0      % Immature Granulocytes 0      NRBCs per 100 WBC 0      Absolute Neutrophils 11.4 (*)     Absolute Lymphocytes 0.6 (*)     Absolute Monocytes 0.4      Absolute Eosinophils 0.2      Absolute Basophils 0.0      Absolute Immature Granulocytes 0.1      Absolute NRBCs 0.0     LACTIC ACID WHOLE BLOOD - Normal    Lactic Acid 2.0     LIPASE - Normal    Lipase 154     TROPONIN I - Normal    Troponin I High Sensitivity <3     ISTAT CREATININE POCT - Normal    Creatinine POCT 0.7      GFR, ESTIMATED POCT >60     COVID-19 VIRUS (CORONAVIRUS) BY PCR   ISTAT CREATININE POCT      Emergency Department Course:    Reviewed:  I reviewed nursing notes, vitals and past medical history    Assessments:  1505 I obtained history and examined the patient as noted above.     1755 I rechecked the patient and explained findings of all her lab tests and imaging. She asked to try to see if she could hold down some ice water now that she's had a couple doses of Zofran.    1900 I checked on the patient. She states she doesn't feel safe to be discharged home. She  initially thought she'd like to go home, but after talking with her family, she realizes it isn't a good idea. She states her  takes medications at night that will make him unable to drive her to the ED or care for her if something happens.     Interventions:  Medications   ondansetron (ZOFRAN) injection 4 mg (4 mg Intravenous Given 2/12/22 1732)   0.9% sodium chloride BOLUS (has no administration in time range)   HYDROmorphone (PF) (DILAUDID) injection 0.5 mg (0.5 mg Intravenous Given 2/12/22 1539)   Saline (80 mLs As instructed Given 2/12/22 1600)   iopamidol (ISOVUE-370) solution 80 mL (80 mLs Intravenous Given 2/12/22 1600)       Disposition:  The patient was admitted to the hospital under the care of Dr. Magallanes for Observation.    Impression & Plan     Medical Decision Making:  I saw and evaluated Ms. Liang for nausea vomiting and abdominal pain of sudden onset today per detailed HPI above. Considering the radiation of pain into her back I was concerned about pancreatitis, however her lipase returned within normal limits. The pain radiation into her back also concerned me for potential aortic dissection.  CT chest, abdomen, pelvis was obtained and fortunately negative for dissection.  CT also showed no acute abdominal abnormality warranting surgery.  She did show a moderate amount of stool burden, and we discussed over-the-counter MiraLAX and stool softener.  CT also showed an incidental adrenal nodule per above and we discussed following up with her primary care physician to follow this over time with repeat CT.  The patient's CBC only showed a very mildly elevated white count at 12.6, most likely a stress reaction from vomiting.  She was not febrile and lactic acid was obtained and returned at 2.0, which was reassuring.  Her hemoglobin was strong at 15.1 and there were no signs of anemia.  Her chemistry panel returned unremarkable.  She had normal kidney function, no elevation in her liver function  "tests, normal electrolytes and unremarkable blood glucose.  Finally, I considered atypical acute coronary syndrome, however fortunately her high sensitivity troponin was not elevated and her EKG showed normal sinus rhythm with no signs of ischemia.  I discussed all of the results with Kelsie.  She was provided IV Fluids, Dilaudid per above for pain and Zofran for nausea.  She felt unsafe to go home considering the level of her pain onset today.  She states her  takes \"heavy hitting medication\" at night and will have the ability to drive her to the emergency department tonight if she has another flareup of this pain.  She feels more comfortable with staying overnight and being watched and hopefully getting a GI consult.  She is familiar with Dr. Burrell's group.  I spoke with Dr. Magallanes who agreed to admit the patient for observation overnight.  All questions were answered prior to admission and the patient was grateful to stay overnight.    Diagnosis:    ICD-10-CM    1. Abdominal pain, generalized  R10.84    2. Adrenal nodule (H)  E27.8     2/12/22 CT: Indeterminate 1.3 cm left adrenal nodule, unchanged. Consider adrenal nodule protocol CT to attempt more definitive characterization.   3. Abdominal pain, unspecified abdominal location  R10.9    4. Sinus tachycardia  R00.0    5. Intractable nausea and vomiting  R11.2      Alysa JEAN APC-T  I saw and evaluated this patient under attending provider Abbie Panchal DNP.           Alysa Lee PA-C  02/12/22 1939    "

## 2022-02-12 NOTE — ED TRIAGE NOTES
Pt reports having nausea and now severe abd pain, pt reports this has happened in the past but never diagnosed with anything

## 2022-02-12 NOTE — ED PROVIDER NOTES
ED ATTENDING PROVIDER NOTE:   I evaluated this patient in conjunction with Alysa Lee PA-C  I have participated in the care of the patient and personally performed key elements of the history, exam, and medical decision making.      HPI:   Kelsie Liang is a 66 year old female, with history of cholecystectomy, sphincter of Oddi dysfunction, ARSENIO on CPAP, GERD, nephrolithiasis, depression and anxiety, who presents for evaluation of abdominal pain.  The patient notes acute onset of bilateral upper abdominal pain starting at 11 AM which radiates to the back.  She denies tearing pain and notes pain is similar to gallbladder issues in the past.  She notes normal bowel movement this morning which did not improve her pain.  She has been nauseated and vomited since onset of pain causing loss of control of urine.  No blood in her emesis or stool.  No recent illness.  She denies chest pain or shortness of breath or fever.  She did take Zofran prior to arrival and notes she is not currently on Protonix.    Of note, the patient was hospitalized from 1/16 through 1/17/2020 for similar symptoms, with tachycardia/leukocytosis, elevated lactate and was evaluated by gastroenterology (see see note below).  She was discharged home with pain control and antiemetics and had a normal abdominal CT and ultrasound and pain was thought at that time to be related to sphincter of Oddi versus musculoskeletal cause.      1/17/2020  [facility].    Abdominal pain  Patient has had similar pain in past associated with Sphincter of Oddi dysfunction. Has had a negative workup and symptoms seem unrelated to eating. On exam patient has pain on 11-12 rib and consistent with muscle strain. Other causes could include PUD or recurrent episode of sphincter of Oddi dysfunction.  - Ok with [facility] to discharge patient with plans to f/u as an outpatient in 1-2 weeks. Can determine if EGD and  endoscopic ultrasound is necessary  - Recommend to have patient start pantoprazole 40 mg daily     EXAM:   Nursing notes reviewed. Vitals reviewed.  General: Alert. Well kept.  Distressed, holding her abdomen.  Eyes:  Conjunctiva non-injected, non-icteric.  Neck/Throat: Moist mucous membranes.  Normal voice.  Cardiac: Tachycardic regular rhythm. Normal heart sounds with no murmur/rubs/click.   Pulmonary: Clear and equal breath sounds bilaterally. No crackles/rales. No wheezing  Abdomen: Soft. Non-distended.  Tender to palpation left and right upper quadrants right greater than left.  No CVA tenderness.  No masses. No guarding or rebound.  Musculoskeletal: Normal gross range of motion of all 4 extremities.    Neurological: Alert and oriented x4.   Skin: Warm and dry without rashes or petechiae. Normal appearance of visualized exposed skin.  Psych: Affect normal. Good eye contact.    EKG:  15:46:46  Ventricular rate 100  LA interval 164  QRS duration 82  QT/QTc 360/464  PRT axes 51 69 50  Normal sinus rhythm, possible left atrial enlargement  No significant change from 1/17/2020    Labs Ordered and Resulted from Time of ED Arrival to Time of ED Departure   COMPREHENSIVE METABOLIC PANEL - Abnormal       Result Value    Sodium 136      Potassium 3.7      Chloride 105      Carbon Dioxide (CO2) 23      Anion Gap 8      Urea Nitrogen 15      Creatinine 0.73      Calcium 9.6      Glucose 123 (*)     Alkaline Phosphatase 126      AST 16      ALT 25      Protein Total 7.6      Albumin 4.0      Bilirubin Total 0.4      GFR Estimate 90     ROUTINE UA WITH MICROSCOPIC REFLEX TO CULTURE - Abnormal    Color Urine Light Yellow      Appearance Urine Clear      Glucose Urine Negative      Bilirubin Urine Negative      Ketones Urine 10  (*)     Specific Gravity Urine 1.005      Blood Urine Small (*)     pH Urine 7.5 (*)     Protein Albumin Urine Negative      Urobilinogen Urine Normal      Nitrite Urine Negative      Leukocyte  Esterase Urine Negative      RBC Urine 7 (*)     WBC Urine 3      Squamous Epithelials Urine 1     CBC WITH PLATELETS AND DIFFERENTIAL - Abnormal    WBC Count 12.6 (*)     RBC Count 4.82      Hemoglobin 15.1      Hematocrit 44.5      MCV 92      MCH 31.3      MCHC 33.9      RDW 12.9      Platelet Count 347      % Neutrophils 91      % Lymphocytes 5      % Monocytes 3      % Eosinophils 1      % Basophils 0      % Immature Granulocytes 0      NRBCs per 100 WBC 0      Absolute Neutrophils 11.4 (*)     Absolute Lymphocytes 0.6 (*)     Absolute Monocytes 0.4      Absolute Eosinophils 0.2      Absolute Basophils 0.0      Absolute Immature Granulocytes 0.1      Absolute NRBCs 0.0     LACTIC ACID WHOLE BLOOD - Normal    Lactic Acid 2.0     LIPASE - Normal    Lipase 154     TROPONIN I - Normal    Troponin I High Sensitivity <3     ISTAT CREATININE POCT - Normal    Creatinine POCT 0.7      GFR, ESTIMATED POCT >60     COVID-19 VIRUS (CORONAVIRUS) BY PCR   ISTAT CREATININE POCT     CT Aortic Survey w Contrast   Final Result   IMPRESSION:   1.  Moderate amount of stool throughout the colon, increased since the previous exam.   2.  No other cause for abdominal pain is identified.   3.  Indeterminate 1.3 cm left adrenal nodule, unchanged. Consider adrenal nodule protocol CT to attempt more definitive characterization.             MEDICAL DECISION MAKING/ASSESSMENT AND PLAN:   Kelsie Liang is a 66 year old female, with history of cholecystectomy, sphincter of Oddi dysfunction, ARSENIO on CPAP, GERD, nephrolithiasis, depression and anxiety, who presents for evaluation of abdominal pain.  On initial exam the patient is tachycardic and writhing on the bed.  A broad differential diagnosis was considered including colitis, appendicitis, intestinal cramping, pyelonephritis, UTI, kidney stone, constipation, diverticulitis, volvulus, ileus, obstruction, aortic aneurysm, ACS, dissection, etc as possibilities.  CT aorta survey  obtained as patient complained of significant pain through to the back and returned negative for dissection, diverticulitis, renal/ureteral stone, appendicitis, obstruction, but did show moderate stool throughout the colon and left adrenal nodule which is unchanged from prior.  Lab work shows a leukocytosis of 12.6 with absolute neutrophils of 11.4.  The patient has had emesis and is likely reactive as lactate is normal and she is afebrile.  Urine is without sign of infection.  Her hepatic panel is without abnormality and no indication for abdominal ultrasound.  Acute coronary syndrome is considered unlikely with a EKG which is nonischemic and her troponin is negative.  The patient did remain tachycardic and was unable to tolerate p.o. fluids due to recurrent nausea.  Despite her tachycardia, PE is considered unlikely with no PE noted on CT, no hypoxia or shortness of breath.  Her pain remained under control with a single dose of Dilaudid.  The patient does not feel safe going home due to her persistent nausea and inability to tolerate fluids.  Dr. Magallanes accepts patient for observation admission.       DIAGNOSIS:     ICD-10-CM    1. Abdominal pain, generalized  R10.84    2. Adrenal nodule (H)  E27.8     2/12/22 CT: Indeterminate 1.3 cm left adrenal nodule, unchanged. Consider adrenal nodule protocol CT to attempt more definitive characterization.   3. Abdominal pain, unspecified abdominal location  R10.9    4. Sinus tachycardia  R00.0    5. Intractable nausea and vomiting  R11.2        DISPOSITION:   Admitted to observation Dr. Sona Panchal DNP  2/12/2022  [facility] EMERGENCY DEPT       Abbie Panchal, CNP  02/12/22 2050

## 2022-02-13 ENCOUNTER — APPOINTMENT (OUTPATIENT)
Dept: MRI IMAGING | Facility: CLINIC | Age: 67
End: 2022-02-13
Attending: INTERNAL MEDICINE
Payer: MEDICARE

## 2022-02-13 VITALS
RESPIRATION RATE: 18 BRPM | SYSTOLIC BLOOD PRESSURE: 115 MMHG | DIASTOLIC BLOOD PRESSURE: 73 MMHG | TEMPERATURE: 98.7 F | OXYGEN SATURATION: 97 % | HEART RATE: 87 BPM | BODY MASS INDEX: 29.03 KG/M2 | WEIGHT: 144 LBS | HEIGHT: 59 IN

## 2022-02-13 PROBLEM — R11.2 INTRACTABLE NAUSEA AND VOMITING: Status: RESOLVED | Noted: 2022-02-12 | Resolved: 2022-02-13

## 2022-02-13 PROBLEM — T81.40XA POST-OPERATIVE INFECTION: Status: RESOLVED | Noted: 2017-11-01 | Resolved: 2022-02-13

## 2022-02-13 PROBLEM — T81.40XA POSTOPERATIVE INFECTION: Status: RESOLVED | Noted: 2017-11-03 | Resolved: 2022-02-13

## 2022-02-13 PROBLEM — R10.84 ABDOMINAL PAIN, GENERALIZED: Status: RESOLVED | Noted: 2022-02-12 | Resolved: 2022-02-13

## 2022-02-13 PROBLEM — K83.4 SPHINCTER OF ODDI DYSFUNCTION: Status: ACTIVE | Noted: 2022-02-13

## 2022-02-13 LAB
ANION GAP SERPL CALCULATED.3IONS-SCNC: 6 MMOL/L (ref 3–14)
BUN SERPL-MCNC: 15 MG/DL (ref 7–30)
CALCIUM SERPL-MCNC: 8.1 MG/DL (ref 8.5–10.1)
CHLORIDE BLD-SCNC: 110 MMOL/L (ref 94–109)
CO2 SERPL-SCNC: 22 MMOL/L (ref 20–32)
CREAT SERPL-MCNC: 0.63 MG/DL (ref 0.52–1.04)
ERYTHROCYTE [DISTWIDTH] IN BLOOD BY AUTOMATED COUNT: 13.2 % (ref 10–15)
GFR SERPL CREATININE-BSD FRML MDRD: >90 ML/MIN/1.73M2
GLUCOSE BLD-MCNC: 113 MG/DL (ref 70–99)
HCT VFR BLD AUTO: 38.2 % (ref 35–47)
HGB BLD-MCNC: 11.9 G/DL (ref 11.7–15.7)
MCH RBC QN AUTO: 30.4 PG (ref 26.5–33)
MCHC RBC AUTO-ENTMCNC: 31.2 G/DL (ref 31.5–36.5)
MCV RBC AUTO: 98 FL (ref 78–100)
PLATELET # BLD AUTO: 251 10E3/UL (ref 150–450)
POTASSIUM BLD-SCNC: 3.9 MMOL/L (ref 3.4–5.3)
RBC # BLD AUTO: 3.91 10E6/UL (ref 3.8–5.2)
SODIUM SERPL-SCNC: 138 MMOL/L (ref 133–144)
UPPER GI ENDOSCOPY: NORMAL
WBC # BLD AUTO: 9.6 10E3/UL (ref 4–11)

## 2022-02-13 PROCEDURE — G0378 HOSPITAL OBSERVATION PER HR: HCPCS

## 2022-02-13 PROCEDURE — 88305 TISSUE EXAM BY PATHOLOGIST: CPT | Mod: TC | Performed by: INTERNAL MEDICINE

## 2022-02-13 PROCEDURE — 82310 ASSAY OF CALCIUM: CPT | Performed by: HOSPITALIST

## 2022-02-13 PROCEDURE — 999N000099 HC STATISTIC MODERATE SEDATION < 10 MIN: Performed by: INTERNAL MEDICINE

## 2022-02-13 PROCEDURE — 99217 PR OBSERVATION CARE DISCHARGE: CPT | Performed by: INTERNAL MEDICINE

## 2022-02-13 PROCEDURE — A9585 GADOBUTROL INJECTION: HCPCS | Performed by: HOSPITALIST

## 2022-02-13 PROCEDURE — 36415 COLL VENOUS BLD VENIPUNCTURE: CPT | Performed by: HOSPITALIST

## 2022-02-13 PROCEDURE — 43239 EGD BIOPSY SINGLE/MULTIPLE: CPT | Performed by: INTERNAL MEDICINE

## 2022-02-13 PROCEDURE — 85027 COMPLETE CBC AUTOMATED: CPT | Performed by: HOSPITALIST

## 2022-02-13 PROCEDURE — 255N000002 HC RX 255 OP 636: Performed by: HOSPITALIST

## 2022-02-13 PROCEDURE — 258N000003 HC RX IP 258 OP 636: Performed by: HOSPITALIST

## 2022-02-13 PROCEDURE — 250N000011 HC RX IP 250 OP 636: Performed by: INTERNAL MEDICINE

## 2022-02-13 PROCEDURE — 45380 COLONOSCOPY AND BIOPSY: CPT | Performed by: INTERNAL MEDICINE

## 2022-02-13 PROCEDURE — 74183 MRI ABD W/O CNTR FLWD CNTR: CPT | Mod: MG

## 2022-02-13 PROCEDURE — 250N000013 HC RX MED GY IP 250 OP 250 PS 637: Performed by: INTERNAL MEDICINE

## 2022-02-13 RX ORDER — LIDOCAINE 40 MG/G
CREAM TOPICAL
Status: CANCELLED | OUTPATIENT
Start: 2022-02-13

## 2022-02-13 RX ORDER — BUPROPION HYDROCHLORIDE 300 MG/1
300 TABLET ORAL EVERY MORNING
Status: DISCONTINUED | OUTPATIENT
Start: 2022-02-13 | End: 2022-02-13 | Stop reason: HOSPADM

## 2022-02-13 RX ORDER — ACETAMINOPHEN 325 MG/1
650 TABLET ORAL EVERY 4 HOURS PRN
Status: DISCONTINUED | OUTPATIENT
Start: 2022-02-13 | End: 2022-02-13 | Stop reason: HOSPADM

## 2022-02-13 RX ORDER — DULOXETIN HYDROCHLORIDE 60 MG/1
60 CAPSULE, DELAYED RELEASE ORAL AT BEDTIME
Status: DISCONTINUED | OUTPATIENT
Start: 2022-02-13 | End: 2022-02-13 | Stop reason: HOSPADM

## 2022-02-13 RX ORDER — GADOBUTROL 604.72 MG/ML
7 INJECTION INTRAVENOUS ONCE
Status: COMPLETED | OUTPATIENT
Start: 2022-02-13 | End: 2022-02-13

## 2022-02-13 RX ORDER — FLUMAZENIL 0.1 MG/ML
0.2 INJECTION, SOLUTION INTRAVENOUS
Status: CANCELLED | OUTPATIENT
Start: 2022-02-13 | End: 2022-02-13

## 2022-02-13 RX ORDER — FENTANYL CITRATE 50 UG/ML
INJECTION, SOLUTION INTRAMUSCULAR; INTRAVENOUS PRN
Status: COMPLETED | OUTPATIENT
Start: 2022-02-13 | End: 2022-02-13

## 2022-02-13 RX ADMIN — ACETAMINOPHEN 650 MG: 325 TABLET, FILM COATED ORAL at 16:57

## 2022-02-13 RX ADMIN — BUPROPION HYDROCHLORIDE 300 MG: 300 TABLET, EXTENDED RELEASE ORAL at 16:35

## 2022-02-13 RX ADMIN — MIDAZOLAM 2 MG: 1 INJECTION INTRAMUSCULAR; INTRAVENOUS at 11:52

## 2022-02-13 RX ADMIN — FENTANYL CITRATE 100 MCG: 50 INJECTION, SOLUTION INTRAMUSCULAR; INTRAVENOUS at 11:51

## 2022-02-13 RX ADMIN — GADOBUTROL 7 ML: 604.72 INJECTION INTRAVENOUS at 14:13

## 2022-02-13 RX ADMIN — SODIUM CHLORIDE: 9 INJECTION, SOLUTION INTRAVENOUS at 06:21

## 2022-02-13 ASSESSMENT — MIFFLIN-ST. JEOR: SCORE: 1098.81

## 2022-02-13 NOTE — PROGRESS NOTES
RECEIVING UNIT ED HANDOFF REVIEW    ED Nurse Handoff Report was reviewed by: Oneil Marie RN on February 12, 2022 at 8:39 PM

## 2022-02-13 NOTE — H&P
[facility]    History and Physical  Hospitalist       Date of Admission:  2/12/2022    Assessment & Plan   Kelsie Liang is a 66 year old female, with history of cholecystectomy, sphincter of Oddi dysfunction, ARSENIO on CPAP, GERD, nephrolithiasis, depression and anxiety presents with abdominal pain.     Abdominal pain  History of Sphincter of Oddi dysfunction   Cause unclear; she has tachycardia, mild leukocytosis without fever, hypotension. CT negative for acute pathology, ie no enteritis or obstruction or perforation, or nephrolithiasis; hepatic panel and Lipase in normal range.  LA WNL, troponins negative.  Hx Sphincter of Oddi dysfunction.  Similar presentation January 2020 without defined etiology.   -N.p.o.  -GI consult, Dr. Burrell  -Pain management, while NPO IV PRN hydromorphone, continue IVF,  cc/hour, BMP in AM. PRN antiemetics, IV while NPO.        ARSENIO: Continue CPAP     Chronic depression and anxiety: PTA Xanax, Cymbalta, Wellbutrin and Provigil: hold with NPO status.   - Resume home medications when taking po's,     Chronic benign hematuria: reportedly with negative prior cystoscopy. Noted    DVT Prophylaxis: Ambulate every shift  Code Status: Full Code  Expected Discharge: 1-2 days pending clinical improvement, pain management and ability to take in p.o.'s, GI plan established      Rigoberto Magallanes MD    Primary Care Physician   Iona Gabriel    Chief Complaint   Abdominal pain    History is obtained from the patient and ED Provider    History of Present Illness   Kelsie Liang is a 66 year old female who presents with abdominal pain.   Onset of bilateral upper abdominal pain this morning radiating to the back.  Normal BM that did not mitigate pain.  Nausea, emesis.  No melena, hematochezia or hematemesis.  Afebrile.  History of sphincter of Oddi dysfunction, no history of PUD, pancreatitis.  Similar presentation in January 2020, as above.   Etiology undefined.  Remainder of HPI as above.    Past Medical History    I have reviewed this patient's medical history and updated it with pertinent information if needed.   Past Medical History:   Diagnosis Date     Anxiety      Arthritis      Bursitis     bilat knees     Concussion 1/28/2016    FELL ON THE ICE     CPAP (continuous positive airway pressure) dependence      Depressive disorder      Gastro-oesophageal reflux disease      Heart murmur      Hematuria      Kidney stone      Sleep apnea     CPAP       Past Surgical History   I have reviewed this patient's surgical history and updated it with pertinent information if needed.  Past Surgical History:   Procedure Laterality Date     BUNIONECTOMY VIRI, REPAIR HAMMER TOE(S), COMBINED Right 12/9/2016    Procedure: COMBINED BUNIONECTOMY LALITHA, REPAIR HAMMER TOE(S);  Surgeon: Jose Massey MD;  Location:  OR     KNEE SURGERY       LAPAROSCOPIC CHOLECYSTECTOMY  12/3/2013    Procedure: LAPAROSCOPIC CHOLECYSTECTOMY;  LAPAROSCOPIC CHOLECYSTECTOMY ;  Surgeon: Dian Lanedros MD;  Location: [facility]     ORTHOPEDIC SURGERY  2009,2010     OSTEOTOMY FOOT Right 12/9/2016    Procedure: OSTEOTOMY FOOT;  Surgeon: Jose Massey MD;  Location:  OR     SHOULDER SURGERY       TONSILLECTOMY         Prior to Admission Medications   Prior to Admission Medications   Prescriptions Last Dose Informant Patient Reported? Taking?   ALPRAZolam (XANAX) 0.25 MG tablet   Yes No   Sig: Take 0.25 mg by mouth 4 times daily as needed for anxiety   DULoxetine (CYMBALTA) 60 MG capsule   Yes No   Sig: Take 60 mg by mouth daily   acetaminophen (TYLENOL) 325 MG tablet  Self No No   Sig: Take 2 tablets (650 mg) by mouth every 4 hours as needed for mild pain or fever   buPROPion (WELLBUTRIN XL) 300 MG 24 hr tablet   Yes No   Sig: Take 300 mg by mouth every morning   modafinil (PROVIGIL) 200 MG tablet   Yes No   Sig: Take 400 mg by mouth daily   ondansetron (ZOFRAN) 4 MG tablet    No No   Sig: Take 1 tablet (4 mg) by mouth every 8 hours as needed for nausea   ondansetron (ZOFRAN-ODT) 4 MG disintegrating tablet  Self No No   Sig: Take 1 tablet (4 mg) by mouth every 6 hours as needed for nausea   pantoprazole (PROTONIX) 40 MG EC tablet   No No   Sig: Take 1 tablet (40 mg) by mouth every morning (before breakfast)      Facility-Administered Medications: None     Allergies   Allergies   Allergen Reactions     Mobic [Meloxicam]      Analgesics-antiinflammatory=night swets       Social History   I have reviewed this patient's social history and updated it with pertinent information if needed. Kelsie Liang  reports that she has never smoked. She has never used smokeless tobacco. She reports current alcohol use. She reports that she does not use drugs. lives with     Family History   I have reviewed this patient's family history and updated it with pertinent information if needed.   No FH colitis.      Review of Systems   The 10 point Review of Systems is negative other than noted in the HPI.    Physical Exam   Temp: 97.2  F (36.2  C) Temp src: Oral BP: 129/85 Pulse: 114   Resp: 13 SpO2: 97 % O2 Device: None (Room air)    Vital Signs with Ranges  Temp:  [97.2  F (36.2  C)] 97.2  F (36.2  C)  Pulse:  [] 114  Resp:  [13-33] 13  BP: (115-129)/(78-85) 129/85  SpO2:  [95 %-98 %] 97 %  140 lbs 0 oz    General/Constitutional:   NAD, alert, calm, cooperative    HEENT/Head Exam:  atraumatic  Eyes:  PERRL, no conjunctivits  Mouth/Oral Pharynx:  Buccal mucosa WNL  Chest/Respiratory:  Air exchange bilateral lung fields; no rales or wheeze. Respiration nonlabored.  Cardiovascular:  no murmur appreciated.  LE edema none  Gastrointestinal/Abdomen:  soft, nontender, no rebound, guarding or other peritoneal signs.  Musculoskeletal:  extremities warm, dry, noncyanotic; no acitve synovitis.  Neuro.  Gross motor tested, nonfocal, sensory intact  Psych oriented, affect calm   Skin:  No rash  noted on gross exam    Data     Recent Labs   Lab 02/12/22  1547 02/12/22  1546   WBC 12.6*  --    HGB 15.1  --    MCV 92  --      --      --    POTASSIUM 3.7  --    CHLORIDE 105  --    CO2 23  --    BUN 15  --    CR 0.73 0.7   ANIONGAP 8  --    JOANNA 9.6  --    *  --    ALBUMIN 4.0  --    PROTTOTAL 7.6  --    BILITOTAL 0.4  --    ALKPHOS 126  --    ALT 25  --    AST 16  --    LIPASE 154  --        Recent Results (from the past 24 hour(s))   CT Aortic Survey w Contrast    Narrative    EXAM: CT AORTIC SURVEY W CONTRAST  LOCATION: [facility]  DATE/TIME: 2/12/2022 3:54 PM    INDICATION: Upper abdominal pain radiating to the back.  COMPARISON: CT of the abdomen and pelvis performed 01/16/2020.  TECHNIQUE: CT angiogram chest abdomen pelvis during arterial phase of injection of IV contrast. 2D and 3D MIP reconstructions were performed by the CT technologist. Dose reduction techniques were used.   CONTRAST: 80 mL Isovue 370        FINDINGS:   CT ANGIOGRAM CHEST, ABDOMEN, AND PELVIS: Mild ectasia of the infrarenal abdominal aorta measures 2.1 cm AP. No evidence for aortic aneurysm or dissection. The great vessels, renal arteries, celiac artery, SMA and CHICO are patent.    LUNGS AND PLEURA: No pleural effusions. Mild scarring or linear atelectasis in the right lower lobe. The lungs are otherwise clear. No pneumothorax.    MEDIASTINUM/AXILLAE: No acute mediastinal abnormality. No lymphadenopathy. No pericardial effusion. Unremarkable esophagus.    CORONARY ARTERY CALCIFICATION: None.    HEPATOBILIARY: Cholecystectomy. No hepatic masses.    PANCREAS: Normal.    SPLEEN: Normal.    ADRENAL GLANDS: Indeterminate 1.3 cm left adrenal nodule is unchanged.    KIDNEYS/BLADDER: No significant mass, stone, or hydronephrosis.    BOWEL: Moderate amount of stool throughout the colon. Scattered sigmoid diverticulosis. No evidence for colitis or diverticulitis.    LYMPH NODES: No  lymphadenopathy.    PELVIC ORGANS: Unremarkable.    MUSCULOSKELETAL: Normal.      Impression    IMPRESSION:  1.  Moderate amount of stool throughout the colon, increased since the previous exam.  2.  No other cause for abdominal pain is identified.  3.  Indeterminate 1.3 cm left adrenal nodule, unchanged. Consider adrenal nodule protocol CT to attempt more definitive characterization.

## 2022-02-13 NOTE — DISCHARGE SUMMARY
[facility]    Discharge Summary  Hospitalist    Date of Admission:  2/12/2022  Date of Discharge:  2/13/2022  Discharging Provider: Mark Anthony Sarabia MD    Discharge Diagnoses   Principal Problem:    Abdominal pain -- specific cause not identified      Nausea with vomiting -- resolved     Active Problems:    Sinus tachycardia -- probable 2nd to dehydration       Left Adrenal Nodule, 1.3 cm CT on 2/12/22      Hx of Sphincter of Oddi dysfunction      History of Present Illness   66 year old female who presents with abdominal pain.   Onset of bilateral upper abdominal pain this morning radiating to the back.  Normal BM that did not mitigate pain.  Nausea, emesis.  No melena, hematochezia or hematemesis.  Afebrile.  History of sphincter of Oddi dysfunction, no history of PUD, pancreatitis.      In ER, AVSS other than HR 98, WBC 12.6, glucose 123, otherwise CMP and remaining CBC normal.  Abd CT with moderate amount of stool, and 1.3 cm incidental nodule in left adrenal.      Hospital Course   Admitted to observation, treated with IV fluids, seen by Dr. Burrell for GI consult, and EGD showed mild gastritis and Abd MRCP was normal.      Abdominal pain resolved and patient tolerating oral intake at time of discharge.  One could repeat abd CT in 3 months to follow-up on adrenal nodule.     Mark Anthony Sarabia MD  Pager: [phone]  Cell Phone:  [phone]       Significant Results and Procedures   As above    Pending Results   These results will be followed up by Dr. Sarabia  Unresulted Labs Ordered in the Past 30 Days of this Admission     No orders found for last 31 day(s).          Code Status   Full Code       Primary Care Physician   Iona Gabriel    Physical Exam   Temp: 98.7  F (37.1  C) Temp src: Oral BP: 115/73 Pulse: 87   Resp: 18 SpO2: 97 % O2 Device: None (Room air)    Vitals:    02/12/22 1503 02/12/22 2109 02/13/22 1055   Weight: 63.5 kg (140 lb) 65.6 kg (144 lb 9.6 oz)  65.3 kg (144 lb)     Vital Signs with Ranges  Temp:  [97.2  F (36.2  C)-98.9  F (37.2  C)] 98.7  F (37.1  C)  Pulse:  [] 87  Resp:  [10-68] 18  BP: ()/(55-85) 115/73  SpO2:  [93 %-99 %] 97 %  No intake/output data recorded.    Exam on discharge:   Abd -- soft, non-tender, normal bowel sounds.     Discharge Disposition   Discharged to home  Condition at discharge: Good    Consultations This Hospital Stay   GASTROENTEROLOGY IP CONSULT    Time Spent on this Encounter   I spent a total of 25 minutes discharging this patient.     Discharge Orders      Reason for your hospital stay    Abdominal pain, cause not identified.     Follow-up and recommended labs and tests     Follow up with primary care provider, Iona Gabriel, in 7 to 10 days if ongoing problems.     Activity    Your activity upon discharge: activity as tolerated     Discharge Instructions    Call Dr. Navarro if any medical questions at Cell Phone [phone].     Diet    Follow this diet upon discharge: Orders Placed This Encounter      Mechanical/Dental Soft Diet     Discharge Medications   Current Discharge Medication List      CONTINUE these medications which have NOT CHANGED    Details   buPROPion (WELLBUTRIN XL) 300 MG 24 hr tablet Take 300 mg by mouth every morning      diphenhydrAMINE-acetaminophen (TYLENOL PM)  MG tablet Take 2 tablets by mouth nightly as needed for sleep      DULoxetine (CYMBALTA) 60 MG capsule Take 60 mg by mouth At Bedtime       modafinil (PROVIGIL) 200 MG tablet Take 400 mg by mouth daily         STOP taking these medications       ondansetron (ZOFRAN) 4 MG tablet Comments:   Reason for Stopping:             Allergies   Allergies   Allergen Reactions     Mobic [Meloxicam]      Analgesics-antiinflammatory=night swets     Data   Most Recent 3 CBC's:Recent Labs   Lab Test 02/13/22  0605 02/12/22  1547 01/17/20  0710   WBC 9.6 12.6* 7.2   HGB 11.9 15.1 11.7   MCV 98 92 94    347 266      Most Recent 3  BMP's:  Recent Labs   Lab Test 02/13/22  0605 02/12/22  2207 02/12/22  1547 02/12/22  1546 01/17/20  0710     --  136  --  140   POTASSIUM 3.9  --  3.7  --  3.7   CHLORIDE 110*  --  105  --  113*   CO2 22  --  23  --  22   BUN 15  --  15  --  16   CR 0.63  --  0.73 0.7 0.72   ANIONGAP 6  --  8  --  5   JOANNA 8.1*  --  9.6  --  7.6*   * 102* 123*  --  108*     Most Recent 2 LFT's:  Recent Labs   Lab Test 02/12/22  1547 01/17/20  0710   AST 16 9   ALT 25 20   ALKPHOS 126 94   BILITOTAL 0.4 0.4     Most Recent INR's and Anticoagulation Dosing History:  Anticoagulation Dose History    There is no flowsheet data to display.       Most Recent 3 Troponin's:  Recent Labs   Lab Test 01/17/20  0003 01/16/20  1934   TROPI <0.015 <0.015     Most Recent Cholesterol Panel:No lab results found.  Most Recent 6 Bacteria Isolates From Any Culture (See EPIC Reports for Culture Details):  Recent Labs   Lab Test 09/03/17 2028   CULT 50,000 to 100,000 colonies/mL  Escherichia coli  *     Most Recent TSH, T4 and A1c Labs:No lab results found.

## 2022-02-13 NOTE — PLAN OF CARE
PRIMARY DIAGNOSIS: ACUTE Abdominal PAIN  OUTPATIENT/OBSERVATION GOALS TO BE MET BEFORE DISCHARGE:  1. Pain Status: Improved but still requiring IV narcotics.    2. Return to near baseline physical activity: Yes    3. Cleared for discharge by consultants (if involved): No    Discharge Planner Nurse   Safe discharge environment identified: No  Barriers to discharge: Yes       Entered by: Oneil Marie 02/13/2022 2:00 AM     Please review provider order for any additional goals.   Nurse to notify provider when observation goals have been met and patient is ready for discharge.

## 2022-02-13 NOTE — PROGRESS NOTES
diagnostic tests and consults completed and resulted  No  -vital signs normal or at patient baseline Yes  -tolerating oral intake to maintain hydration NA  -adequate pain control on oral analgesics Yes  -safe disposition plan has been identified NA

## 2022-02-13 NOTE — PROVIDER NOTIFICATION
MD Notification    Notified Person: MD    Notified Person Name: Sona Dash     Notification Date/Time: MD     Notification Interaction: AMCOM     Purpose of Notification: FYI, patient is c/o nausea, zofran is not due yet, can we give compazine?     Orders Received: Compazine  Gm po/IV ordered, Compazine 12.5mg Suppository     Comments:

## 2022-02-13 NOTE — ED NOTES
[facility]  ED Nurse Handoff Report    ED Chief complaint: Abdominal Pain      ED Diagnosis:   Final diagnoses:   Adrenal nodule (H) - 2/12/22 CT: Indeterminate 1.3 cm left adrenal nodule, unchanged. Consider adrenal nodule protocol CT to attempt more definitive characterization.       Code Status: Full Code    Allergies:   Allergies   Allergen Reactions     Mobic [Meloxicam]      Analgesics-antiinflammatory=night swets       Patient Story: pt presents with severe abd pain with previous Hx of abd issues. Pt given 0.5 mg dilaudid and Zofran, adrenal nodule found of CT exam   Focused Assessment:  abd pain     Treatments and/or interventions provided: IV, labs fluids, Zofran, dilaudid   Patient's response to treatments and/or interventions: pain decreased n/v controlled     To be done/followed up on inpatient unit:  continued abd pain     Does this patient have any cognitive concerns?: none    Activity level - Baseline/Home:  Independent  Activity Level - Current:   Stand with Assist    Patient's Preferred language: English   Needed?: No    Isolation: None  Infection: Not Applicable  Patient tested for COVID 19 prior to admission: NO  Bariatric?: No    Vital Signs:   Vitals:    02/12/22 1555 02/12/22 1615 02/12/22 1715 02/12/22 1720   BP:  115/78 129/85 129/85   Pulse: 98 63 103 101   Resp: 20  25 24   Temp:   97.2  F (36.2  C)    TempSrc:   Oral    SpO2: 95% 97%     Weight:           Cardiac Rhythm:     Was the PSS-3 completed:   Yes  What interventions are required if any?               Family Comments updates when available   OBS brochure/video discussed/provided to patient/family: No              Name of person given brochure if not patient:               Relationship to patient:     For the majority of the shift this patient's behavior was Green.   Behavioral interventions performed were suman.    ED NURSE PHONE NUMBER: [phone]

## 2022-02-13 NOTE — PROVIDER NOTIFICATION
MD Notification    Notified Person: MD    Notified Person Name: Dr Navarro, 1540 PM    Notification Date/Time: Text paged    Notification Interaction: Text paged     Purpose of Notification: Pt asking for her PTA Cymbalta and Bupropion, says she will start to withdraw if she puts them off longer. Also MRCP result is back as well.     Orders Received:    Comments:

## 2022-02-13 NOTE — PLAN OF CARE
A&OX4, VSS on RA. Denies abd pain, n&V. Took some tylenol for headache. All discharge meds and instructions reviewed with pt. Pt verbalized understanding. Stable at time of discharge. All belongings returned to the pt.

## 2022-02-13 NOTE — PLAN OF CARE
Observation goals  PRIOR TO DISCHARGE        Comments:   -diagnostic tests and consults completed and resulted : partially met, pending GI consult   -vital signs normal or at patient baseline:met , VSS on RA, tachycardic but pt adv HR is baseline   -tolerating oral intake to maintain hydration:  on total NPO   -adequate pain control on oral analgesics:met   -safe disposition plan has been identified: na  Nurse to notify provider when observation goals have been met and patient is ready for discharge.

## 2022-02-13 NOTE — PROGRESS NOTES
diagnostic tests and consults completed and resulted  Yes  -vital signs normal or at patient baseline Yes  -tolerating oral intake to maintain hydration Pending  -adequate pain control on oral analgesics Yes  -safe disposition plan has been identified NA

## 2022-02-13 NOTE — PLAN OF CARE
Observation goals  PRIOR TO DISCHARGE        Comments:   -diagnostic tests and consults completed and resulted: pending GI Consult   -vital signs normal or at patient baseline:met   -tolerating oral intake to maintain hydration:;NPO   -adequate pain control on oral analgesics:met   -safe disposition plan has been identified:pending GI consult   Nurse to notify provider when observation goals have been met and patient is ready for discharge.

## 2022-02-13 NOTE — PHARMACY-ADMISSION MEDICATION HISTORY
Pharmacy Medication History  Admission medication history interview status for the 2/12/2022  admission is complete. See EPIC admission navigator for prior to admission medications     Location of Interview: Patient room  Medication history sources: Patient and Surescripts    Significant changes made to the medication list:  Removed: APAP, alprazolam, ondansetron, pantoprazole    In the past week, patient estimated taking medication this percent of the time: greater than 90%    Additional medication history information:   None    Medication reconciliation completed by provider prior to medication history? No    Time spent in this activity: 10 min     Prior to Admission medications    Medication Sig Last Dose Taking? Auth Provider   buPROPion (WELLBUTRIN XL) 300 MG 24 hr tablet Take 300 mg by mouth every morning 2/12/2022 at Unknown time Yes Unknown, Entered By History   diphenhydrAMINE-acetaminophen (TYLENOL PM)  MG tablet Take 2 tablets by mouth nightly as needed for sleep Past Week at Unknown time Yes Unknown, Entered By History   DULoxetine (CYMBALTA) 60 MG capsule Take 60 mg by mouth At Bedtime  2/11/2022 at Unknown time Yes Unknown, Entered By History   modafinil (PROVIGIL) 200 MG tablet Take 400 mg by mouth daily 2/12/2022 at am Yes Unknown, Entered By History       The information provided in this note is only as accurate as the sources available at the time of update(s)

## 2022-02-13 NOTE — CONSULTS
[facility]  Gastroenterology Consultation         Kelsie Liang  [address] [facility] [address]  66 year old female    Admission Date/Time: 2/12/2022  Primary Care Provider: Iona Gabriel  Referring / Attending Physician:  Dr. Magallanes    We were asked to see the patient in consultation by Dr. Magallanes for evaluation of acute abdominal pain with nausea vomiting.      CC: Acute abdominal pain with nausea vomiting    HPI:  Kelsie Liang is a 66 year old female who was admitted with acute onset of severe abdominal pain in the epigastric area radiating to upper back patient is s/p cholecystectomy patient feels her symptoms are similar to gallbladder attack patient has been labeled with sphincter of 40 dysfunction in the past patient has history of sleep apnea patient is on CPAP gastroesophageal reflux disease nephrolithiasis depression anxiety.  Patient presented with the above-mentioned symptoms patient is already starting to feel better.  Patient had similar episode about 2 years ago work-up at that time was negative.  Patient's labs were normal CT scan showed possible constipation with retained stool.  Rest of the exam was unremarkable.  Patient is laying fairly comfortably now denying any chest pain shortness of breath or any other systemic complaint rest of review of system is negative    ROS: A comprehensive ten point review of systems was negative aside from those in mentioned in the HPI.      PAST MED HX:  I have reviewed this patient's medical history and updated it with pertinent information if needed.   Past Medical History:   Diagnosis Date     Anxiety      Arthritis      Bursitis     bilat knees     Concussion 1/28/2016    FELL ON THE ICE     CPAP (continuous positive airway pressure) dependence      Depressive disorder      Gastro-oesophageal reflux disease      Heart murmur      Hematuria      Kidney stone      Sleep apnea     CPAP        MEDICATIONS:   Prior to Admission Medications   Prescriptions Last Dose Informant Patient Reported? Taking?   DULoxetine (CYMBALTA) 60 MG capsule 2/11/2022 at Unknown time  Yes Yes   Sig: Take 60 mg by mouth At Bedtime    buPROPion (WELLBUTRIN XL) 300 MG 24 hr tablet 2/12/2022 at Unknown time  Yes Yes   Sig: Take 300 mg by mouth every morning   diphenhydrAMINE-acetaminophen (TYLENOL PM)  MG tablet Past Week at Unknown time  Yes Yes   Sig: Take 2 tablets by mouth nightly as needed for sleep   modafinil (PROVIGIL) 200 MG tablet 2/12/2022 at am  Yes Yes   Sig: Take 400 mg by mouth daily   ondansetron (ZOFRAN) 4 MG tablet   No No   Sig: Take 1 tablet (4 mg) by mouth every 8 hours as needed for nausea      Facility-Administered Medications: None       ALLERGIES:   Allergies   Allergen Reactions     Mobic [Meloxicam]      Analgesics-antiinflammatory=night swets       SOCIAL HISTORY:  Social History     Tobacco Use     Smoking status: Never Smoker     Smokeless tobacco: Never Used   Substance Use Topics     Alcohol use: Yes     Comment: occassionally     Drug use: No       FAMILY HISTORY:  History reviewed. No pertinent family history.    PHYSICAL EXAM:   General awake alert oriented comfortable  Vital Signs with Ranges  Temp: 97.7  F (36.5  C) Temp src: Oral BP: 110/77 Pulse: 92   Resp: 16 SpO2: 97 % O2 Device: None (Room air)    No intake/output data recorded.    Constitutional: healthy, alert and no distress   Cardiovascular: negative, PMI normal. No lifts, heaves, or thrills. RRR. No murmurs, clicks gallops or rub  Respiratory: negative, Percussion normal. Good diaphragmatic excursion. Lungs clear  Neck: Neck supple. No adenopathy. Thyroid symmetric, normal size,, Carotids without bruits.  Abdomen: Abdomen soft, non-tender. BS normal. No masses, organomegaly  SKIN: no suspicious lesions or rashes          ADDITIONAL COMMENTS:   I reviewed the patient's new clinical lab test results.   Recent Labs   Lab Test  02/13/22  0605 02/12/22  1547 01/17/20  0710   WBC 9.6 12.6* 7.2   HGB 11.9 15.1 11.7   MCV 98 92 94    347 266     Recent Labs   Lab Test 02/13/22  0605 02/12/22  1547 01/17/20  0710   POTASSIUM 3.9 3.7 3.7   CHLORIDE 110* 105 113*   CO2 22 23 22   BUN 15 15 16   ANIONGAP 6 8 5     Recent Labs   Lab Test 02/12/22  1729 02/12/22  1547 01/17/20  0710 01/16/20  1936 01/16/20  1934 09/03/17  2028 12/08/15  1125 12/07/15  2127   ALBUMIN  --  4.0 2.8*  --  4.0  --   --  3.9   BILITOTAL  --  0.4 0.4  --  0.4  --   --  0.3   ALT  --  25 20  --  27  --   --  31   AST  --  16 9  --  19  --   --  18   PROTEIN Negative  --   --  Negative  --  10*   < >  --    LIPASE  --  154  --   --  213  --   --  297    < > = values in this interval not displayed.       I reviewed the patient's new imaging results.        CONSULTATION ASSESSMENT AND PLAN:    Principal Problem:    Abdominal pain  Very pleasant 66-year-old female with history of acute onset of abdominal pain in the epigastric area radiating to upper back with episodes of nausea vomiting patient is clinically feeling much better patient work-up is unremarkable possible differential can include peptic ulcer disease gastroenteritis possible irritable bowel syndrome patient's history of nephrolithiasis makes it possible renal stones patient work-up from biliary and pancreatic causes in the past has been unremarkable.  I will recommend her to be evaluated with upper GI endoscopy.  If EGD negative then consider MRCP.  Risk-benefit plan discussed in detail with patient continue on current treatment plan.  Thank you very much for letting me participate in his care.            Willie Burrell MD, FACP  [facility].  Office: [phone]  Cell : [phone]      [facility]  Ph: [phone] Fax: [phone]                      66 year old female with H/O abdominal pain and Nausea, vomiting.  Negative labs.  I/V Protonix  Plan for EGD  today.  NPO    Willie Burrell MD FACP  [facility]

## 2022-02-15 LAB
PATH REPORT.COMMENTS IMP SPEC: NORMAL
PATH REPORT.COMMENTS IMP SPEC: NORMAL
PATH REPORT.FINAL DX SPEC: NORMAL
PATH REPORT.GROSS SPEC: NORMAL
PATH REPORT.MICROSCOPIC SPEC OTHER STN: NORMAL
PATH REPORT.RELEVANT HX SPEC: NORMAL
PHOTO IMAGE: NORMAL

## 2022-02-15 PROCEDURE — 88305 TISSUE EXAM BY PATHOLOGIST: CPT | Mod: 26 | Performed by: PATHOLOGY

## 2023-02-03 ENCOUNTER — HOSPITAL ENCOUNTER (OUTPATIENT)
Dept: MAMMOGRAPHY | Facility: CLINIC | Age: 68
Discharge: HOME | End: 2023-02-03
Attending: FAMILY MEDICINE | Admitting: FAMILY MEDICINE
Payer: MEDICARE

## 2023-02-03 DIAGNOSIS — Z12.31 VISIT FOR SCREENING MAMMOGRAM: ICD-10-CM

## 2023-02-03 PROCEDURE — 77067 SCR MAMMO BI INCL CAD: CPT

## 2023-02-13 PROCEDURE — 88305 TISSUE EXAM BY PATHOLOGIST: CPT | Mod: TC,ORL | Performed by: INTERNAL MEDICINE

## 2023-02-13 PROCEDURE — 88305 TISSUE EXAM BY PATHOLOGIST: CPT | Mod: 26 | Performed by: PATHOLOGY

## 2023-02-14 ENCOUNTER — LAB REQUISITION (OUTPATIENT)
Dept: LAB | Facility: CLINIC | Age: 68
End: 2023-02-14
Payer: MEDICARE

## 2023-02-14 DIAGNOSIS — D12.3 BENIGN NEOPLASM OF TRANSVERSE COLON: ICD-10-CM

## 2023-02-14 DIAGNOSIS — Z12.11 ENCOUNTER FOR SCREENING FOR MALIGNANT NEOPLASM OF COLON: ICD-10-CM

## 2023-02-14 DIAGNOSIS — K57.30 DIVERTICULOSIS OF LARGE INTESTINE WITHOUT PERFORATION OR ABSCESS WITHOUT BLEEDING: ICD-10-CM

## 2024-02-22 ENCOUNTER — HOSPITAL ENCOUNTER (OUTPATIENT)
Dept: MAMMOGRAPHY | Facility: CLINIC | Age: 69
Discharge: HOME | End: 2024-02-22
Attending: FAMILY MEDICINE | Admitting: FAMILY MEDICINE
Payer: MEDICARE

## 2024-02-22 DIAGNOSIS — Z12.31 VISIT FOR SCREENING MAMMOGRAM: ICD-10-CM

## 2024-02-22 PROCEDURE — 77063 BREAST TOMOSYNTHESIS BI: CPT

## 2024-09-17 ENCOUNTER — MEDICAL CORRESPONDENCE (OUTPATIENT)
Dept: HEALTH INFORMATION MANAGEMENT | Facility: CLINIC | Age: 69
End: 2024-09-17
Payer: MEDICARE

## 2024-09-18 DIAGNOSIS — M25.519 PAIN IN SHOULDER: ICD-10-CM

## 2024-09-18 DIAGNOSIS — R00.0 SINUS TACHYCARDIA: ICD-10-CM

## 2024-09-18 DIAGNOSIS — R42 DIZZINESSES: Primary | ICD-10-CM

## 2024-09-18 DIAGNOSIS — R10.9 ABDOMINAL PAIN: ICD-10-CM

## 2024-09-18 DIAGNOSIS — E27.9 ADRENAL NODULE (H): ICD-10-CM

## 2024-09-18 DIAGNOSIS — R07.9 CHEST PAIN: ICD-10-CM

## 2024-09-18 DIAGNOSIS — M76.899 ENTHESOPATHY OF HIP REGION: ICD-10-CM

## 2024-09-18 DIAGNOSIS — R42 LIGHTHEADEDNESS: ICD-10-CM

## 2024-09-18 DIAGNOSIS — M25.559 PAIN IN JOINT, PELVIC REGION AND THIGH: ICD-10-CM

## 2024-09-18 DIAGNOSIS — R11.2 NAUSEA WITH VOMITING: ICD-10-CM

## 2024-09-18 DIAGNOSIS — K83.4 SPHINCTER OF ODDI DYSFUNCTION: ICD-10-CM

## 2024-10-03 ENCOUNTER — HOSPITAL ENCOUNTER (OUTPATIENT)
Dept: CARDIOLOGY | Facility: CLINIC | Age: 69
Discharge: HOME | End: 2024-10-03
Attending: FAMILY MEDICINE | Admitting: FAMILY MEDICINE
Payer: MEDICARE

## 2024-10-03 DIAGNOSIS — M25.559 PAIN IN JOINT, PELVIC REGION AND THIGH: ICD-10-CM

## 2024-10-03 DIAGNOSIS — M25.519 PAIN IN SHOULDER: ICD-10-CM

## 2024-10-03 DIAGNOSIS — R07.9 CHEST PAIN: ICD-10-CM

## 2024-10-03 DIAGNOSIS — K83.4 SPHINCTER OF ODDI DYSFUNCTION: ICD-10-CM

## 2024-10-03 DIAGNOSIS — R11.2 NAUSEA WITH VOMITING: ICD-10-CM

## 2024-10-03 DIAGNOSIS — E27.9 ADRENAL NODULE (H): ICD-10-CM

## 2024-10-03 DIAGNOSIS — R10.9 ABDOMINAL PAIN: ICD-10-CM

## 2024-10-03 DIAGNOSIS — R42 DIZZINESSES: ICD-10-CM

## 2024-10-03 DIAGNOSIS — R00.0 SINUS TACHYCARDIA: ICD-10-CM

## 2024-10-03 DIAGNOSIS — R42 LIGHTHEADEDNESS: ICD-10-CM

## 2024-10-03 DIAGNOSIS — M76.899 ENTHESOPATHY OF HIP REGION: ICD-10-CM

## 2024-10-03 PROCEDURE — 93018 CV STRESS TEST I&R ONLY: CPT | Performed by: INTERNAL MEDICINE

## 2024-10-03 PROCEDURE — 93017 CV STRESS TEST TRACING ONLY: CPT

## 2024-10-03 PROCEDURE — 93350 STRESS TTE ONLY: CPT | Mod: 26 | Performed by: INTERNAL MEDICINE

## 2024-10-03 PROCEDURE — 255N000002 HC RX 255 OP 636: Performed by: FAMILY MEDICINE

## 2024-10-03 PROCEDURE — 93325 DOPPLER ECHO COLOR FLOW MAPG: CPT | Mod: 26 | Performed by: INTERNAL MEDICINE

## 2024-10-03 PROCEDURE — 93016 CV STRESS TEST SUPVJ ONLY: CPT | Performed by: INTERNAL MEDICINE

## 2024-10-03 PROCEDURE — 93325 DOPPLER ECHO COLOR FLOW MAPG: CPT | Mod: TC

## 2024-10-03 PROCEDURE — 93321 DOPPLER ECHO F-UP/LMTD STD: CPT | Mod: 26 | Performed by: INTERNAL MEDICINE

## 2024-10-03 RX ADMIN — HUMAN ALBUMIN MICROSPHERES AND PERFLUTREN 9 ML: 10; .22 INJECTION, SOLUTION INTRAVENOUS at 11:28

## 2024-12-22 ENCOUNTER — HOSPITAL ENCOUNTER (EMERGENCY)
Facility: CLINIC | Age: 69
Discharge: HOME | End: 2024-12-22
Attending: EMERGENCY MEDICINE
Payer: MEDICARE

## 2024-12-22 ENCOUNTER — APPOINTMENT (OUTPATIENT)
Dept: CT IMAGING | Facility: CLINIC | Age: 69
End: 2024-12-22
Attending: EMERGENCY MEDICINE
Payer: MEDICARE

## 2024-12-22 VITALS
TEMPERATURE: 97.6 F | RESPIRATION RATE: 16 BRPM | DIASTOLIC BLOOD PRESSURE: 97 MMHG | SYSTOLIC BLOOD PRESSURE: 147 MMHG | OXYGEN SATURATION: 96 % | HEART RATE: 116 BPM

## 2024-12-22 DIAGNOSIS — S09.90XA CLOSED HEAD INJURY, INITIAL ENCOUNTER: ICD-10-CM
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
## 2024-12-22 DIAGNOSIS — S01.81XA LACERATION OF FOREHEAD, INITIAL ENCOUNTER: ICD-10-CM

## 2024-12-22 PROCEDURE — 99284 EMERGENCY DEPT VISIT MOD MDM: CPT | Mod: 25

## 2024-12-22 PROCEDURE — 12013 RPR F/E/E/N/L/M 2.6-5.0 CM: CPT

## 2024-12-22 PROCEDURE — 250N000009 HC RX 250: Performed by: STUDENT IN AN ORGANIZED HEALTH CARE EDUCATION/TRAINING PROGRAM

## 2024-12-22 PROCEDURE — 250N000013 HC RX MED GY IP 250 OP 250 PS 637: Performed by: STUDENT IN AN ORGANIZED HEALTH CARE EDUCATION/TRAINING PROGRAM

## 2024-12-22 PROCEDURE — 70450 CT HEAD/BRAIN W/O DYE: CPT | Mod: MA

## 2024-12-22 RX ORDER — ACETAMINOPHEN 500 MG
1000 TABLET ORAL ONCE
Status: COMPLETED | OUTPATIENT
Start: 2024-12-22 | End: 2024-12-22

## 2024-12-22 RX ADMIN — ACETAMINOPHEN 1000 MG: 500 TABLET, FILM COATED ORAL at 17:20

## 2024-12-22 RX ADMIN — Medication: at 17:15

## 2024-12-22 ASSESSMENT — ACTIVITIES OF DAILY LIVING (ADL)
ADLS_ACUITY_SCORE: 46
ADLS_ACUITY_SCORE: 46

## 2024-12-22 NOTE — ED TRIAGE NOTES
Pt reports she walked in the house and foot got caught on something pt fell forward and hit a book shelf on the left side of forehead     Denies blood thinner use or LOC no n/v since accident

## 2024-12-23 NOTE — DISCHARGE INSTRUCTIONS
Please keep the laceration clean and dry.  The best way to prevent scarring is to use sunscreen and keep the area out of the sun.  Please have the stitches removed in 5 days.  Please return if the area becomes red, swollen, infected.  Soap and water is just fine, including showers.  Please do not submerge the laceration in standing water such as pools, hot tubs, bathtubs.    Discharge Instructions  Head Injury    You have been seen today for a head injury. Your evaluation included a history and physical examination. You may have had a CT (CAT) scan performed, though most head injuries do not require a scan. Based on this evaluation, your provider today does not feel that your head injury is serious.    Generally, every Emergency Department visit should have a follow-up clinic visit with either a primary or a specialty clinic/provider. Please follow-up as instructed by your emergency provider today.  Return to the Emergency Department if:  You are confused or you are not acting right.  Your headache gets worse or you start to have a really bad headache even with your recommended treatment plan.  You vomit (throw up) more than once.  You have a seizure.  You have trouble walking.  You have weakness or paralysis (cannot move) in an arm or a leg.  You have blood or fluid coming from your ears or nose.  You have new symptoms or anything that worries you.    Sleeping:  It is okay for you to sleep, but someone should wake you up if instructed by your provider, and someone should check on you at your usual time to wake up.     Activity:  Do not drive for at least 24 hours.  Do not drive if you have dizzy spells or trouble concentrating, or remembering things.  Do not return to any contact sports until cleared by your regular provider.     MORE INFORMATION:    Concussion:  A concussion is a minor head injury that may cause temporary problems with the way the brain works. Although concussions are important, they are generally  not an emergency or a reason that a person needs to be hospitalized. Some concussion symptoms include confusion, amnesia (forgetful), nausea (sick to your stomach) and vomiting (throwing up), dizziness, fatigue, memory or concentration problems, irritability and sleep problems. For most people, concussions are mild and temporary but some will have more severe and persistent symptoms that require on-going care and treatment.  CT Scans: Your evaluation today may have included a CT scan (CAT scan) to look for things like bleeding or a skull fracture (broken bone).  CT scans involve radiation and too many CT scans can cause serious health problems like cancer, especially in children.  Because of this, your provider may not have ordered a CT scan today if they think you are at low risk for a serious or life threatening problem.    Blood Thinners. If you take blood thinners, there is a very small risk of delayed bleeding after your head injury. In the days/weeks to come, watch for the symptoms described above particularly headaches, confusion, problems walking, and weakness in an arm or leg.    If you were given a prescription for medicine here today, be sure to read all of the information (including the package insert) that comes with your prescription.  This will include important information about the medicine, its side effects, and any warnings that you need to know about.  The pharmacist who fills the prescription can provide more information and answer questions you may have about the medicine.  If you have questions or concerns that the pharmacist cannot address, please call or return to the Emergency Department.     Remember that you can always come back to the Emergency Department if you are not able to see your regular provider in the amount of time listed above, if you get any new symptoms, or if there is anything that worries you.

## 2024-12-23 NOTE — ED PROVIDER NOTES
Emergency Department Note      History of Present Illness     Chief Complaint   Fall and Laceration      HPI   Kelsie Liang is a 69 year old female presenting to the ED for evaluation after a fall. The patient reports that she tripped over a dog bed on the floor, causing her to fall forward and hit her head on a book case. She sustained a laceration through her left eyebrow. After the fall, she developed a headache and felt nauseous. She landed on her knees, but denies any other injury from the fall. No loss of consciousness, neck pain, or other medical concerns at this time. The patient is not anticoagulated and her tetanus is up to date.     Independent Historian   None    Review of External Notes       Past Medical History     Medical History and Problem List   Anxiety  Arthritis  Bursitis  CPAP  Depressive disorder  GERD  Heart murmur  Kidney stone  Sleep apnea   Enthesopathy   Left adrenal nodule  Sphincter of Oddi dysfunction     Medications   Wellbutrin   Tylenol  Cymbalta  Provigil     Surgical History   Bunionectomy francesco  Colonoscopy  Cholecystectomy  Orthopedic surgery  Osteotomy  Shoulder surgery  Tonsillectomy     Physical Exam     Patient Vitals for the past 24 hrs:   BP Temp Temp src Pulse Resp SpO2   12/22/24 1716 (!) 147/97 97.6  F (36.4  C) Temporal 116 16 96 %     Physical Exam  General: The patient is alert, has no immediate need for airway protection and no signs of toxicity.  Eyes: Pupils equal and reactive. No pallor or injection.  ENT:  Moist mucus membranes. No hrenandez sign. No periorbital swelling or ecchymosis. No hemotympanum.  No septal hematoma.  Head: 3 cm laceration through the left eyebrow.  Neck: No midline tenderness to palpation.   Respiratory:  Lungs clear to auscultation bilaterally, no crackles/rubs/wheezes.  Good air movement.  CV: Normal rate and rhythm, no murmurs.  Abdomen: No tenderness, guarding or rebound.  Skin: Warm, dry.  No lesions or  abrasion.  Musculoskeletal: Patient moves all extremities. Extremities are non-tender, non swollen, and have full range of motion.  No midline tenderness of the spine.    Neuro: Awake, alert. Follows commands.    Psychiatric: Normal affect      Diagnostics     Lab Results   Labs Ordered and Resulted from Time of ED Arrival to Time of ED Departure - No data to display    Imaging   Head CT w/o contrast   Final Result   IMPRESSION: Negative for acute intracranial hemorrhage. Left periorbital scalp laceration. No underlying skull fracture.      Report per radiology.     Independent Interpretation   None    ED Course      Medications Administered   Medications   lido-EPINEPHrine-tetracaine (LET) topical gel GEL ( Topical $Given 12/22/24 1719)   acetaminophen (TYLENOL) tablet 1,000 mg (1,000 mg Oral $Given 12/22/24 1720)     Procedures   Procedures     Laceration Repair      Procedure: Laceration Repair    Indication: Laceration    Consent: Verbal    Tetanus status reviewed    Location: Left Forehead    Length: 3 cm    Preparation: Irrigation with Sterile Saline.    Anesthesia/Sedation: Lidocaine - 1%      Treatment/Exploration: Wound explored, no foreign bodies found     Closure: The wound was closed with one layer. Skin/superficial layer was closed with 5 x 5-0 Nylon using Interrupted sutures.     Patient Status: The patient tolerated the procedure well: Yes. There were no complications.      Discussion of Management   None    ED Course   ED Course as of 12/22/24 1956   Sun Dec 22, 2024   1815 I obtained history and examined the patient as noted above.     1929 I rechecked the patient and performed a laceration repair.    1956 Patient is comfortable with discharge.     Additional Documentation  None    Medical Decision Making / Diagnosis     CMS Diagnoses: None    MIPS       None    [facility]   Kelsie RUST Roe Liang is a 69 year old female with a history of GERD, depression presents emergency department with a complaint of  a fall.  Patient tripped on a dog bed and fell forward and hit her forehead.  She denies any neck pain.  She did lose consciousness.  On exam patient is awake, alert, answering questions appropriately.  She does have a 3 cm laceration through her left eyebrow.  No neck pain.  No other joint pain or extremity pain.  Abdomen is soft and nontender.  Vital signs are within acceptable limits.  Patient does have a headache and is given Tylenol.  Laceration is repaired.  Tetanus is up-to-date.  CT scan does not show any intracranial abnormalities.  Patient is feeling back to her baseline.  Patient is given return precautions.  She is discharged home.    Disposition   The patient was discharged.     Diagnosis     ICD-10-CM    1. Fall, initial encounter  W19.XXXA       2. Laceration of forehead, initial encounter  S01.81XA       3. Closed head injury, initial encounter  S09.90XA         Scribe Disclosure:  I, Marycruz Li, am serving as a scribe at 6:23 PM on 12/22/2024 to document services personally performed by Karen Ward MD based on my observations and the provider's statements to me.        Karen Ward MD  12/23/24 0030

## 2025-02-28 ENCOUNTER — ANCILLARY PROCEDURE (OUTPATIENT)
Dept: GENERAL RADIOLOGY | Facility: CLINIC | Age: 70
End: 2025-02-28
Attending: STUDENT IN AN ORGANIZED HEALTH CARE EDUCATION/TRAINING PROGRAM
Payer: MEDICARE

## 2025-02-28 DIAGNOSIS — M25.562 BILATERAL KNEE PAIN: ICD-10-CM

## 2025-02-28 DIAGNOSIS — M25.561 BILATERAL KNEE PAIN: ICD-10-CM

## 2025-02-28 PROCEDURE — 77073 BONE LENGTH STUDIES: CPT | Mod: TC | Performed by: RADIOLOGY

## 2025-02-28 PROCEDURE — 73562 X-RAY EXAM OF KNEE 3: CPT | Mod: TC | Performed by: RADIOLOGY

## 2025-03-31 ENCOUNTER — ANCILLARY ORDERS (OUTPATIENT)
Dept: MAMMOGRAPHY | Facility: CLINIC | Age: 70
End: 2025-03-31
Payer: COMMERCIAL

## 2025-03-31 DIAGNOSIS — Z12.31 VISIT FOR SCREENING MAMMOGRAM: Primary | ICD-10-CM

## 2025-04-18 ENCOUNTER — HOSPITAL ENCOUNTER (OUTPATIENT)
Dept: MAMMOGRAPHY | Facility: CLINIC | Age: 70
Discharge: HOME | End: 2025-04-18
Attending: FAMILY MEDICINE | Admitting: FAMILY MEDICINE
Payer: MEDICARE

## 2025-04-18 ENCOUNTER — THERAPY VISIT (OUTPATIENT)
Dept: PHYSICAL THERAPY | Facility: CLINIC | Age: 70
End: 2025-04-18
Attending: PHYSICIAN ASSISTANT
Payer: MEDICARE

## 2025-04-18 DIAGNOSIS — M17.11 PRIMARY OSTEOARTHRITIS OF RIGHT KNEE: ICD-10-CM

## 2025-04-18 DIAGNOSIS — Z12.31 VISIT FOR SCREENING MAMMOGRAM: ICD-10-CM

## 2025-04-18 PROCEDURE — 97530 THERAPEUTIC ACTIVITIES: CPT | Mod: GP | Performed by: PHYSICAL THERAPIST

## 2025-04-18 PROCEDURE — 97161 PT EVAL LOW COMPLEX 20 MIN: CPT | Mod: GP | Performed by: PHYSICAL THERAPIST

## 2025-04-18 PROCEDURE — 77063 BREAST TOMOSYNTHESIS BI: CPT

## 2025-04-18 NOTE — PROGRESS NOTES
PHYSICAL THERAPY EVALUATION  Type of Visit: Evaluation        Fall Risk Screen:  Have you fallen 2 or more times in the past year?: No  Have you fallen and had an injury in the past year?: Yes      Subjective         Presenting condition or subjective complaint: knee artgritis; ongoing issues since 2008.  Date of onset: 02/28/25    Relevant medical history: Arthritis; Concussions; Depression   Dates & types of surgery: larteral release x 2 2009; multiple foot and shoulder surgeries.  Prior diagnostic imaging/testing results: MRI; X-ray     Prior therapy history for the same diagnosis, illness or injury: Yes PT, PRP,synovial fluid injections. SIJ issues wears an SIJ belt.     Living Environment  Social support: With a significant other or spouse   Type of home: House; Multi-level   Stairs to enter the home:         Ramp:     Stairs inside the home:         Help at home: None  Equipment owned:     Employment:     and   Hobbies/Interests:    Patient goals for therapy: dog sledding (a lot of balance and maneuvering); weight circuit training, biking, hiking.  Pain assessment: See objective evaluation for additional pain details     Objective   PAIN:   Pain Location: anterior and medial joint line  Pain Quality: Aching  Pain is Exacerbated By: Extended activity; balance, pivoting  Pain is Relieved By: consistent strength training, PRP, activity avoidance  Standing Alignment:              Knee:  Genu varus R and genu varus L      Gait:       Weight Bearing Status:  WBAT     Deviations:  Hip:  Decr dynamic control R, decr dynamic control L and LE crosses midline R                                                    Knee Evaluation:  ROM:  Arom wnl knee: comparable AROM B knee.                Ligament Testing:  Ligament testing knee: globally ligamentously lax; - ant drawer, some noted laxity with valgus stress test R.                  Palpation:      Right knee tenderness present at:  Medial Joint Line  and Popliteal      Functional Testing:          Quad:    Single Leg Squat:  Left:           Right:        Bilateral Leg Squat:   Mild loss of control and femoral IR      Proprioception:    Stork Balance Test:  Left:  + trendelenberg  Right:  Inc dynamic sway; + trendelenberg  % of Uninvolved:           Ossur Knee  Brace Trial:     Pain Rating    Affected Joint: Right Knee    Without  brace:  At rest 0/10  Walkin/10    Squattin/10   Stairs: 3/10      Wearing  brace:  At rest 0/10  Walkin/10; rpt inc support  Squattin/10   Stairs: 0/10 rpt inc support, able to ascend/descend reciprocal     Brace and Measurements:    Brace trialed:     Type - Ossur  One  Size - Medium SHORT  Side - Medial  Affected Knee - Right Knee    Circumference 6 inches below mid patella: 15 inches (Ossur ) and Circumference 6 inches above mid patella: 19 inches (Ossur OA Ease Knee )    Other Comments:  See PT evaluation in [facility] for any additional information including joint special testing/ligament testing       Assessment & Plan   CLINICAL IMPRESSIONS  Medical Diagnosis: R knee primary OA    Treatment Diagnosis: R knee primary OA   Impression/Assessment: Patient is a 69 year old female with R knee complaints.  The following significant findings have been identified: Pain, Decreased ROM/flexibility, Decreased strength, Impaired balance, and Decreased activity tolerance. These impairments interfere with their ability to perform self care tasks, household chores, household mobility, and community mobility as compared to previous level of function.     Clinical Decision Making (Complexity):  Clinical Presentation: Stable/Uncomplicated  Clinical Presentation Rationale: based on medical and personal factors listed in PT evaluation  Clinical Decision Making (Complexity): Low complexity    PLAN OF CARE  Treatment Interventions:  Interventions: Neuromuscular Re-education, Therapeutic  Activity, Therapeutic Exercise, Self-Care/Home Management,  Bracing    Long Term Goals     PT Goal 1  Goal Identifier: ambulation  Goal Description: ambulate on uneven surfaces w/o restriction d/t pain  Rationale: to maximize safety and independence within the community;to maximize safety and independence with self cares;to maximize safety and independence with performance of ADLs and functional tasks  Target Date: 07/17/25      Frequency of Treatment: bi-monthly  Duration of Treatment: up to 90d  Education Assessment:   Learner/Method: Patient  Education Comments: edu on indications for wear of  brace    Risks and benefits of evaluation/treatment have been explained.   Patient/Family/caregiver agrees with Plan of Care.     Evaluation Time:     PT Eval, Low Complexity Minutes (54499): 15     Signing Clinician: Ivanna Youngblood PT    [facility]                                                                                   OUTPATIENT PHYSICAL THERAPY      PLAN OF TREATMENT FOR OUTPATIENT REHABILITATION   Patient's Last Name, First Name, M.ESTELLEHonorio  Kelsie Shaw  BARRERA YOB: 1955   Provider's Name   [facility]   Medical Record No.  [id number]     Onset Date: 02/28/25  Start of Care Date: 04/18/25     Medical Diagnosis:  R knee primary OA      PT Treatment Diagnosis:  R knee primary OA Plan of Treatment  Frequency/Duration: bi-monthly/ up to 90d    Certification date from 04/18/25 to 07/17/25         See note for plan of treatment details and functional goals     Ivanna Youngblood PT                         I CERTIFY THE NEED FOR THESE SERVICES FURNISHED UNDER        THIS PLAN OF TREATMENT AND WHILE UNDER MY CARE     (Physician attestation of this document indicates review and certification of the therapy plan).              Referring Provider:  Kenneth Joiner    Initial Assessment  See Epic Evaluation- Start of Care Date:  04/18/25

## 2025-04-22 ENCOUNTER — DOCUMENTATION ONLY (OUTPATIENT)
Dept: ORTHOPEDICS | Facility: CLINIC | Age: 70
End: 2025-04-22
Payer: COMMERCIAL

## 2025-04-22 DIAGNOSIS — M17.11 PRIMARY OSTEOARTHRITIS OF RIGHT KNEE: Primary | ICD-10-CM

## (undated) RX ORDER — FENTANYL CITRATE 50 UG/ML
INJECTION, SOLUTION INTRAMUSCULAR; INTRAVENOUS
Status: DISPENSED
Start: 2022-02-13